# Patient Record
Sex: FEMALE | Race: WHITE | NOT HISPANIC OR LATINO | Employment: OTHER | URBAN - METROPOLITAN AREA
[De-identification: names, ages, dates, MRNs, and addresses within clinical notes are randomized per-mention and may not be internally consistent; named-entity substitution may affect disease eponyms.]

---

## 2023-09-08 PROBLEM — E03.9 HYPOTHYROIDISM: Status: ACTIVE | Noted: 2023-09-08

## 2023-09-08 PROBLEM — N81.3 COMPLETE UTEROVAGINAL PROLAPSE: Status: ACTIVE | Noted: 2023-09-08

## 2023-09-08 PROBLEM — E78.2 COMBINED HYPERLIPIDEMIA: Status: ACTIVE | Noted: 2023-09-08

## 2023-09-08 PROBLEM — M51.26 LUMBAR HERNIATED DISC: Status: ACTIVE | Noted: 2023-09-08

## 2023-09-08 PROBLEM — E78.00 ELEVATED CHOLESTEROL: Status: ACTIVE | Noted: 2023-09-08

## 2023-09-08 PROBLEM — I83.93 VARICOSE VEINS OF LEGS: Status: RESOLVED | Noted: 2023-09-08 | Resolved: 2023-09-08

## 2023-09-08 PROBLEM — N32.81 OVERACTIVE BLADDER: Status: ACTIVE | Noted: 2023-09-08

## 2023-09-08 PROBLEM — M54.12 CERVICAL RADICULOPATHY: Status: ACTIVE | Noted: 2023-09-08

## 2023-09-08 PROBLEM — L90.0 LICHEN SCLEROSUS: Status: ACTIVE | Noted: 2023-09-08

## 2023-09-08 PROBLEM — I83.813 VARICOSE VEINS OF BILATERAL LOWER EXTREMITIES WITH PAIN: Status: ACTIVE | Noted: 2023-09-08

## 2023-09-08 PROBLEM — I83.93 VARICOSE VEINS OF LEGS: Status: ACTIVE | Noted: 2023-09-08

## 2023-09-08 PROBLEM — I65.23 ATHEROSCLEROSIS OF BOTH CAROTID ARTERIES: Status: ACTIVE | Noted: 2023-09-08

## 2023-09-08 PROBLEM — Z15.89 MTHFR MUTATION: Status: ACTIVE | Noted: 2023-09-08

## 2023-09-08 PROBLEM — R22.30 MASS OF HAND: Status: ACTIVE | Noted: 2023-09-08

## 2023-09-08 PROBLEM — E28.39 HYPOESTROGENISM: Status: ACTIVE | Noted: 2023-09-08

## 2023-09-08 PROBLEM — R73.9 HYPERGLYCEMIA: Status: ACTIVE | Noted: 2023-09-08

## 2023-09-08 PROBLEM — R79.89 LOW VITAMIN D LEVEL: Status: ACTIVE | Noted: 2023-09-08

## 2023-09-08 PROBLEM — N39.41 URGE INCONTINENCE: Status: ACTIVE | Noted: 2023-09-08

## 2023-09-08 PROBLEM — M67.40 GANGLION, TENDON SHEATH: Status: ACTIVE | Noted: 2023-09-08

## 2023-09-08 PROBLEM — Z78.0 ASYMPTOMATIC AGE-RELATED POSTMENOPAUSAL STATE: Status: ACTIVE | Noted: 2023-09-08

## 2023-09-08 PROBLEM — N95.2 VAGINAL ATROPHY: Status: ACTIVE | Noted: 2023-09-08

## 2023-09-08 PROBLEM — C73 CANCER OF THYROID (MULTI): Status: ACTIVE | Noted: 2023-09-08

## 2023-09-08 PROBLEM — M54.16 LUMBAR RADICULOPATHY: Status: ACTIVE | Noted: 2023-09-08

## 2023-09-08 PROBLEM — R73.03 PREDIABETES: Status: ACTIVE | Noted: 2023-09-08

## 2023-09-08 RX ORDER — LEVOTHYROXINE SODIUM 112 UG/1
1 TABLET ORAL DAILY
COMMUNITY
Start: 2014-07-07 | End: 2024-01-02

## 2023-09-08 RX ORDER — ACETAMINOPHEN 500 MG
TABLET ORAL
COMMUNITY

## 2023-09-08 RX ORDER — ESTRADIOL 0.1 MG/G
1 CREAM VAGINAL NIGHTLY
COMMUNITY
Start: 2018-02-16 | End: 2023-10-30 | Stop reason: ALTCHOICE

## 2023-09-08 RX ORDER — MAGNESIUM 200 MG
200 TABLET ORAL
COMMUNITY
Start: 2019-08-21

## 2023-09-08 RX ORDER — TRETINOIN 0.25 MG/G
1 CREAM TOPICAL NIGHTLY
COMMUNITY

## 2023-09-08 RX ORDER — IBUPROFEN 100 MG/5ML
1000 SUSPENSION, ORAL (FINAL DOSE FORM) ORAL
COMMUNITY

## 2023-09-08 RX ORDER — TROSPIUM CHLORIDE 20 MG/1
20 TABLET, FILM COATED ORAL 2 TIMES DAILY PRN
COMMUNITY
End: 2023-10-30 | Stop reason: ALTCHOICE

## 2023-09-08 RX ORDER — GLUCOSAMINE/CHONDR SU A SOD 167-133 MG
500 CAPSULE ORAL
COMMUNITY

## 2023-09-08 RX ORDER — BETAMETHASONE DIPROPIONATE 0.5 MG/G
1 CREAM TOPICAL DAILY
COMMUNITY
Start: 2018-11-29 | End: 2023-10-12 | Stop reason: SDUPTHER

## 2023-09-08 RX ORDER — CLOBETASOL PROPIONATE 0.5 MG/G
1 OINTMENT TOPICAL
COMMUNITY
Start: 2020-07-06 | End: 2023-10-30 | Stop reason: ALTCHOICE

## 2023-10-11 NOTE — H&P (VIEW-ONLY)
Urogynecology  Provider:  Nadia Baig MD  [phone]              ASSESSMENT AND PLAN:     Problem List Items Addressed This Visit    None          I spent a total of 25 minutes in face to face and non face to face time.        Nadia Baig MD        HISTORY OF PRESENT ILLNESS    Marcelle Manley a 73 y.o. here today for a follow up     Urinary symptoms:  - Denies concerns  - Not interested in medication at this time     Vaginal symptoms:  - Using PRN Clobetasol about once a month   - Using TV estrogen cream           Past Medical History:   Diagnosis Date    Allergic rhinitis due to pollen     Hayfever    Asymptomatic varicose veins of bilateral lower extremities 12/09/2016    Varicose veins of legs    COVID-19 10/04/2022    COVID-19    Encounter for gynecological examination (general) (routine) without abnormal findings 11/06/2017    Visit for gynecologic examination    Encounter for other preprocedural examination 02/22/2017    Preoperative clearance    Laceration without foreign body of unspecified finger without damage to nail, initial encounter 05/27/2015    Finger laceration    Localized swelling, mass and lump, unspecified upper limb 10/28/2019    Mass of hand    Malignant neoplasm of thyroid gland (CMS/HCC) 08/21/2019    Papillary carcinoma of thyroid    Onycholysis 01/24/2019    Onycholysis of toenail    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    Hypoestrogenism    Other specified abnormal findings of blood chemistry 11/12/2015    Elevated liver function tests    Pain in joints of unspecified hand 06/11/2015    Pain in joint, hand    Pain in left leg 11/18/2020    Pain of left lower extremity    Pain in right ankle and joints of right foot 12/28/2020    Ankle pain, right    Pain in right toe(s) 01/24/2019    Pain around toenail, right foot    Paresthesia of skin     Paresthesia of arm    Personal history of colonic polyps     History of colonic polyps    Personal history of malignant  neoplasm of thyroid 10/25/2021    History of malignant neoplasm of thyroid    Personal history of malignant neoplasm of thyroid 12/26/2018    History of malignant neoplasm of thyroid    Personal history of other diseases of the female genital tract 05/20/2019    History of uterine prolapse    Personal history of other diseases of the respiratory system     History of sinusitis    Personal history of other endocrine, nutritional and metabolic disease 02/22/2017    History of high cholesterol    Personal history of other infectious and parasitic diseases 04/29/2019    History of herpes zoster    Personal history of other specified conditions 05/20/2019    History of urinary incontinence    Personal history of urinary calculi 04/24/2019    History of renal calculi    Prediabetes 10/25/2021    Prediabetes    Radiculopathy, cervical region 11/18/2020    Cervical radiculopathy    Radiculopathy, lumbar region 01/14/2021    Lumbar radiculopathy    Unspecified open wound of unspecified toe(s) with damage to nail, initial encounter 01/24/2019    Traumatic avulsion of nail plate of toe    Urge incontinence 11/15/2021    Urge incontinence    Urinary tract infection, site not specified 01/13/2022    Acute lower UTI    Varicose veins of bilateral lower extremities with pain 08/21/2019    Varicose veins of bilateral lower extremities with pain          Past Surgical History:       Past Surgical History:   Procedure Laterality Date    COLONOSCOPY  02/07/2017    Colonoscopy    TONSILLECTOMY  02/07/2017    Tonsillectomy    TOTAL THYROIDECTOMY  08/11/2014    Thyroid Surgery Total Thyroidectomy         Medications:       Prior to Admission medications    Medication Sig Start Date End Date Taking? Authorizing Provider   ascorbic acid (Vitamin C) 1,000 mg tablet Take 1 tablet (1,000 mg) by mouth.    Historical Provider, MD   betamethasone dipropionate 0.05 % cream Apply 1 Application topically once daily. 11/29/18   Historical Provider,  MD   cholecalciferol (Vitamin D-3) 5,000 Units tablet Take by mouth.    Historical Provider, MD   clobetasol (Temovate) 0.05 % ointment 1 inch. APPLY 1 INCH Twice daily apply to affected area twice daily for 2 weeks, then 1 daily for 2 weeks then prn only 7/6/20   Historical Provider, MD   estradiol (Estrace) 0.01 % (0.1 mg/gram) vaginal cream Insert 1 applicator into the vagina once daily at bedtime. 3 nights per week 2/16/18   Julio César Provider, MD   estrogens, conjugated, (Premarin) vaginal cream Insert 0.0005 g into the vagina 2 times a week. At bedtime 9/1/22   Julio César Provider, MD   fish oil concentrate (Omega-3) 120-180 mg capsule Take 1 capsule (1 g) by mouth.    Historical Provider, MD   L. acidophilus/Bifid. animalis 32 billion cell capsule Take by mouth. 11/12/15   Julio César Provider, MD   magnesium 200 mg tablet Take 1 tablet (200 mg) by mouth. 8/21/19   Historical Provider, MD   multivitamin capsule Take 1 capsule by mouth once daily. 11/12/15   Historical Provider, MD   niacin 500 mg tablet Take 1 tablet (500 mg) by mouth.    Historical Provider, MD   Synthroid 112 mcg tablet Take 1 tablet (112 mcg) by mouth once daily. 7/7/14   Historical Provider, MD   tretinoin (Retin-A) 0.025 % cream Apply 1 Application topically once daily at bedtime.    Historical Provider, MD   trospium (Sanctura) 20 mg tablet Take 1 tablet (20 mg) by mouth 2 times a day as needed.    Historical ProviderMD [facility]  Review of Systems       PHYSICAL EXAM:      There were no vitals taken for this visit.     No LMP recorded.      Declines chaperone for physical exam.      Well developed, well nourished, in no apparent distress.   Neurologic/Psychiatric:  Awake, Alert and Oriented times 3.  Affect normal.     GENITAL/URINARY:       External Genitalia:  The patient has normal appearing external genitalia, normal skenes and bartholins glands, and a normal hair distribution.  Her vulva is without lesions, erythema or  discharge.  It is non-tender with appropriate sensation.     Urethral Meatus:  Size normal, Location normal, Lesions absent, Prolapse absent,      Urethra:  Fullness absent, Masses absent,      Bladder:  Fullness absent, Masses absent, Tenderness absent,      Vagina:  General appearance normal, Estrogen effect normal, Lesions absent, bilateral lichen sclerosis on medial labial. NO lesions to biopsy    Cervix: Normal, no discharge.   Uterus:  normal size  Adnexa:   WNL    Anus/Perineum:  Lesions absent and Masses absent defer exam  No Hemorrhoids      POP-Q  The patient has no prolapse         Data and DIAGNOSTIC STUDIES REVIEWED   No results found.   Lab Results   Component Value Date    UAMICCOMM SEE COMMENT 02/12/2018      Lab Results   Component Value Date    GLUCOSE 102 (H) 02/09/2023    CALCIUM 9.0 02/09/2023     02/09/2023    K 4.2 02/09/2023    CO2 30 02/09/2023     02/09/2023    BUN 20 02/09/2023    CREATININE 0.74 02/09/2023     Lab Results   Component Value Date    WBC 5.2 11/07/2022    HGB 13.9 11/07/2022    HCT 41.0 11/07/2022    MCV 95 11/07/2022     11/07/2022          Nadia Baig MD    ASSESSMENT &PLAN     Assessment:   73 y.o. old female being assessed for lichen sclerosis and vaginal atrophy     Diagnoses:   #1 Lichen sclerosus  #2 Vaginal atrophy    Plan:   Lichen Sclerosis   - Continue use of PRN Clobetasol cream     2. Vaginal atrophy  - Continue use of TV estrogen cream twice a week     Follow-up in 6 months with Dr. Jovanni Booth Attestation  By signing my name below, I, Emmy Knox   attest that this documentation has been prepared under the direction and in the presence of Nadia Baig MD.     Agree with above  I Dr. Baig, personally performed the services described in the documentation which was scribed virtually and confirm it is both complete and accurate.   Nadia Baig MD

## 2023-10-11 NOTE — PROGRESS NOTES
Urogynecology  Provider:  Nadia Baig MD  [phone]              ASSESSMENT AND PLAN:     Problem List Items Addressed This Visit    None          I spent a total of 25 minutes in face to face and non face to face time.        Nadia Baig MD        HISTORY OF PRESENT ILLNESS    Marcelle Manley a 73 y.o. here today for a follow up     Urinary symptoms:  - Denies concerns  - Not interested in medication at this time     Vaginal symptoms:  - Using PRN Clobetasol about once a month   - Using TV estrogen cream           Past Medical History:   Diagnosis Date    Allergic rhinitis due to pollen     Hayfever    Asymptomatic varicose veins of bilateral lower extremities 12/09/2016    Varicose veins of legs    COVID-19 10/04/2022    COVID-19    Encounter for gynecological examination (general) (routine) without abnormal findings 11/06/2017    Visit for gynecologic examination    Encounter for other preprocedural examination 02/22/2017    Preoperative clearance    Laceration without foreign body of unspecified finger without damage to nail, initial encounter 05/27/2015    Finger laceration    Localized swelling, mass and lump, unspecified upper limb 10/28/2019    Mass of hand    Malignant neoplasm of thyroid gland (CMS/HCC) 08/21/2019    Papillary carcinoma of thyroid    Onycholysis 01/24/2019    Onycholysis of toenail    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    Hypoestrogenism    Other specified abnormal findings of blood chemistry 11/12/2015    Elevated liver function tests    Pain in joints of unspecified hand 06/11/2015    Pain in joint, hand    Pain in left leg 11/18/2020    Pain of left lower extremity    Pain in right ankle and joints of right foot 12/28/2020    Ankle pain, right    Pain in right toe(s) 01/24/2019    Pain around toenail, right foot    Paresthesia of skin     Paresthesia of arm    Personal history of colonic polyps     History of colonic polyps    Personal history of malignant  neoplasm of thyroid 10/25/2021    History of malignant neoplasm of thyroid    Personal history of malignant neoplasm of thyroid 12/26/2018    History of malignant neoplasm of thyroid    Personal history of other diseases of the female genital tract 05/20/2019    History of uterine prolapse    Personal history of other diseases of the respiratory system     History of sinusitis    Personal history of other endocrine, nutritional and metabolic disease 02/22/2017    History of high cholesterol    Personal history of other infectious and parasitic diseases 04/29/2019    History of herpes zoster    Personal history of other specified conditions 05/20/2019    History of urinary incontinence    Personal history of urinary calculi 04/24/2019    History of renal calculi    Prediabetes 10/25/2021    Prediabetes    Radiculopathy, cervical region 11/18/2020    Cervical radiculopathy    Radiculopathy, lumbar region 01/14/2021    Lumbar radiculopathy    Unspecified open wound of unspecified toe(s) with damage to nail, initial encounter 01/24/2019    Traumatic avulsion of nail plate of toe    Urge incontinence 11/15/2021    Urge incontinence    Urinary tract infection, site not specified 01/13/2022    Acute lower UTI    Varicose veins of bilateral lower extremities with pain 08/21/2019    Varicose veins of bilateral lower extremities with pain          Past Surgical History:       Past Surgical History:   Procedure Laterality Date    COLONOSCOPY  02/07/2017    Colonoscopy    TONSILLECTOMY  02/07/2017    Tonsillectomy    TOTAL THYROIDECTOMY  08/11/2014    Thyroid Surgery Total Thyroidectomy         Medications:       Prior to Admission medications    Medication Sig Start Date End Date Taking? Authorizing Provider   ascorbic acid (Vitamin C) 1,000 mg tablet Take 1 tablet (1,000 mg) by mouth.    Historical Provider, MD   betamethasone dipropionate 0.05 % cream Apply 1 Application topically once daily. 11/29/18   Historical Provider,  MD   cholecalciferol (Vitamin D-3) 5,000 Units tablet Take by mouth.    Historical Provider, MD   clobetasol (Temovate) 0.05 % ointment 1 inch. APPLY 1 INCH Twice daily apply to affected area twice daily for 2 weeks, then 1 daily for 2 weeks then prn only 7/6/20   Historical Provider, MD   estradiol (Estrace) 0.01 % (0.1 mg/gram) vaginal cream Insert 1 applicator into the vagina once daily at bedtime. 3 nights per week 2/16/18   Julio César Provider, MD   estrogens, conjugated, (Premarin) vaginal cream Insert 0.0005 g into the vagina 2 times a week. At bedtime 9/1/22   Julio César Provider, MD   fish oil concentrate (Omega-3) 120-180 mg capsule Take 1 capsule (1 g) by mouth.    Historical Provider, MD   L. acidophilus/Bifid. animalis 32 billion cell capsule Take by mouth. 11/12/15   Julio César Provider, MD   magnesium 200 mg tablet Take 1 tablet (200 mg) by mouth. 8/21/19   Historical Provider, MD   multivitamin capsule Take 1 capsule by mouth once daily. 11/12/15   Historical Provider, MD   niacin 500 mg tablet Take 1 tablet (500 mg) by mouth.    Historical Provider, MD   Synthroid 112 mcg tablet Take 1 tablet (112 mcg) by mouth once daily. 7/7/14   Historical Provider, MD   tretinoin (Retin-A) 0.025 % cream Apply 1 Application topically once daily at bedtime.    Historical Provider, MD   trospium (Sanctura) 20 mg tablet Take 1 tablet (20 mg) by mouth 2 times a day as needed.    Historical ProviderMD [facility]  Review of Systems       PHYSICAL EXAM:      There were no vitals taken for this visit.     No LMP recorded.      Declines chaperone for physical exam.      Well developed, well nourished, in no apparent distress.   Neurologic/Psychiatric:  Awake, Alert and Oriented times 3.  Affect normal.     GENITAL/URINARY:       External Genitalia:  The patient has normal appearing external genitalia, normal skenes and bartholins glands, and a normal hair distribution.  Her vulva is without lesions, erythema or  discharge.  It is non-tender with appropriate sensation.     Urethral Meatus:  Size normal, Location normal, Lesions absent, Prolapse absent,      Urethra:  Fullness absent, Masses absent,      Bladder:  Fullness absent, Masses absent, Tenderness absent,      Vagina:  General appearance normal, Estrogen effect normal, Lesions absent, bilateral lichen sclerosis on medial labial. NO lesions to biopsy    Cervix: Normal, no discharge.   Uterus:  normal size  Adnexa:   WNL    Anus/Perineum:  Lesions absent and Masses absent defer exam  No Hemorrhoids      POP-Q  The patient has no prolapse         Data and DIAGNOSTIC STUDIES REVIEWED   No results found.   Lab Results   Component Value Date    UAMICCOMM SEE COMMENT 02/12/2018      Lab Results   Component Value Date    GLUCOSE 102 (H) 02/09/2023    CALCIUM 9.0 02/09/2023     02/09/2023    K 4.2 02/09/2023    CO2 30 02/09/2023     02/09/2023    BUN 20 02/09/2023    CREATININE 0.74 02/09/2023     Lab Results   Component Value Date    WBC 5.2 11/07/2022    HGB 13.9 11/07/2022    HCT 41.0 11/07/2022    MCV 95 11/07/2022     11/07/2022          Nadia Baig MD    ASSESSMENT &PLAN     Assessment:   73 y.o. old female being assessed for lichen sclerosis and vaginal atrophy     Diagnoses:   #1 Lichen sclerosus  #2 Vaginal atrophy    Plan:   Lichen Sclerosis   - Continue use of PRN Clobetasol cream     2. Vaginal atrophy  - Continue use of TV estrogen cream twice a week     Follow-up in 6 months with Dr. Jovanni Booth Attestation  By signing my name below, I, Emmy Knox   attest that this documentation has been prepared under the direction and in the presence of Nadia Baig MD.     Agree with above  I Dr. Baig, personally performed the services described in the documentation which was scribed virtually and confirm it is both complete and accurate.   Nadia Baig MD

## 2023-10-12 ENCOUNTER — OFFICE VISIT (OUTPATIENT)
Dept: OBSTETRICS AND GYNECOLOGY | Facility: CLINIC | Age: 73
End: 2023-10-12
Payer: MEDICARE

## 2023-10-12 VITALS
WEIGHT: 174 LBS | DIASTOLIC BLOOD PRESSURE: 70 MMHG | SYSTOLIC BLOOD PRESSURE: 120 MMHG | HEIGHT: 70 IN | BODY MASS INDEX: 24.91 KG/M2

## 2023-10-12 DIAGNOSIS — L90.0 LICHEN SCLEROSUS ET ATROPHICUS: ICD-10-CM

## 2023-10-12 DIAGNOSIS — N32.81 OVERACTIVE BLADDER: Primary | ICD-10-CM

## 2023-10-12 PROCEDURE — 1159F MED LIST DOCD IN RCRD: CPT | Performed by: OBSTETRICS & GYNECOLOGY

## 2023-10-12 PROCEDURE — 99213 OFFICE O/P EST LOW 20 MIN: CPT | Performed by: OBSTETRICS & GYNECOLOGY

## 2023-10-12 PROCEDURE — 99213 OFFICE O/P EST LOW 20 MIN: CPT | Mod: PO | Performed by: OBSTETRICS & GYNECOLOGY

## 2023-10-12 PROCEDURE — 1126F AMNT PAIN NOTED NONE PRSNT: CPT | Performed by: OBSTETRICS & GYNECOLOGY

## 2023-10-12 RX ORDER — BETAMETHASONE DIPROPIONATE 0.5 MG/G
1 CREAM TOPICAL DAILY
Qty: 1 G | Refills: 2 | Status: SHIPPED | OUTPATIENT
Start: 2023-10-12 | End: 2023-10-25 | Stop reason: SDUPTHER

## 2023-10-12 RX ORDER — BETAMETHASONE DIPROPIONATE 0.5 MG/G
1 CREAM TOPICAL DAILY
Qty: 1 G | Refills: 2 | Status: SHIPPED | OUTPATIENT
Start: 2023-10-12 | End: 2023-10-12 | Stop reason: SDUPTHER

## 2023-10-12 ASSESSMENT — PATIENT HEALTH QUESTIONNAIRE - PHQ9
SUM OF ALL RESPONSES TO PHQ9 QUESTIONS 1 AND 2: 0
2. FEELING DOWN, DEPRESSED OR HOPELESS: NOT AT ALL
1. LITTLE INTEREST OR PLEASURE IN DOING THINGS: NOT AT ALL

## 2023-10-12 ASSESSMENT — PAIN SCALES - GENERAL: PAINLEVEL: 0-NO PAIN

## 2023-10-25 ENCOUNTER — TELEPHONE (OUTPATIENT)
Dept: OBSTETRICS AND GYNECOLOGY | Facility: CLINIC | Age: 73
End: 2023-10-25
Payer: MEDICARE

## 2023-10-25 DIAGNOSIS — L90.0 LICHEN SCLEROSUS ET ATROPHICUS: ICD-10-CM

## 2023-10-25 RX ORDER — BETAMETHASONE DIPROPIONATE 0.5 MG/G
1 CREAM TOPICAL DAILY
Qty: 15 G | Refills: 1 | Status: SHIPPED | OUTPATIENT
Start: 2023-10-25 | End: 2024-02-22 | Stop reason: SDUPTHER

## 2023-10-27 ENCOUNTER — ANESTHESIA EVENT (OUTPATIENT)
Dept: OPERATING ROOM | Facility: HOSPITAL | Age: 73
End: 2023-10-27
Payer: MEDICARE

## 2023-10-30 ENCOUNTER — ANESTHESIA (OUTPATIENT)
Dept: OPERATING ROOM | Facility: HOSPITAL | Age: 73
End: 2023-10-30
Payer: MEDICARE

## 2023-10-30 ENCOUNTER — HOSPITAL ENCOUNTER (OUTPATIENT)
Dept: OPERATING ROOM | Facility: HOSPITAL | Age: 73
Setting detail: OUTPATIENT SURGERY
Discharge: HOME | End: 2023-10-30
Payer: MEDICARE

## 2023-10-30 VITALS
SYSTOLIC BLOOD PRESSURE: 163 MMHG | TEMPERATURE: 97.5 F | BODY MASS INDEX: 24.62 KG/M2 | WEIGHT: 171.96 LBS | HEART RATE: 70 BPM | HEIGHT: 70 IN | RESPIRATION RATE: 16 BRPM | DIASTOLIC BLOOD PRESSURE: 87 MMHG | OXYGEN SATURATION: 100 %

## 2023-10-30 DIAGNOSIS — Z12.11 ENCOUNTER FOR SCREENING FOR MALIGNANT NEOPLASM OF COLON: ICD-10-CM

## 2023-10-30 PROCEDURE — A45378 PR COLONOSCOPY,DIAGNOSTIC: Performed by: NURSE ANESTHETIST, CERTIFIED REGISTERED

## 2023-10-30 PROCEDURE — 3600000002 HC OR TIME - INITIAL BASE CHARGE - PROCEDURE LEVEL TWO: Performed by: NURSE ANESTHETIST, CERTIFIED REGISTERED

## 2023-10-30 PROCEDURE — 2500000004 HC RX 250 GENERAL PHARMACY W/ HCPCS (ALT 636 FOR OP/ED): Performed by: ANESTHESIOLOGY

## 2023-10-30 PROCEDURE — 3700000001 HC GENERAL ANESTHESIA TIME - INITIAL BASE CHARGE: Performed by: NURSE ANESTHETIST, CERTIFIED REGISTERED

## 2023-10-30 PROCEDURE — 3600000007 HC OR TIME - EACH INCREMENTAL 1 MINUTE - PROCEDURE LEVEL TWO: Performed by: NURSE ANESTHETIST, CERTIFIED REGISTERED

## 2023-10-30 PROCEDURE — 2500000005 HC RX 250 GENERAL PHARMACY W/O HCPCS: Performed by: NURSE ANESTHETIST, CERTIFIED REGISTERED

## 2023-10-30 PROCEDURE — 7100000009 HC PHASE TWO TIME - INITIAL BASE CHARGE: Performed by: NURSE ANESTHETIST, CERTIFIED REGISTERED

## 2023-10-30 PROCEDURE — G0121 COLON CA SCRN NOT HI RSK IND: HCPCS | Performed by: SURGERY

## 2023-10-30 PROCEDURE — 7100000010 HC PHASE TWO TIME - EACH INCREMENTAL 1 MINUTE: Performed by: NURSE ANESTHETIST, CERTIFIED REGISTERED

## 2023-10-30 PROCEDURE — 3700000002 HC GENERAL ANESTHESIA TIME - EACH INCREMENTAL 1 MINUTE: Performed by: NURSE ANESTHETIST, CERTIFIED REGISTERED

## 2023-10-30 PROCEDURE — 2500000004 HC RX 250 GENERAL PHARMACY W/ HCPCS (ALT 636 FOR OP/ED): Performed by: NURSE ANESTHETIST, CERTIFIED REGISTERED

## 2023-10-30 RX ORDER — DROPERIDOL 2.5 MG/ML
0.62 INJECTION, SOLUTION INTRAMUSCULAR; INTRAVENOUS ONCE AS NEEDED
Status: DISCONTINUED | OUTPATIENT
Start: 2023-10-30 | End: 2023-10-31 | Stop reason: HOSPADM

## 2023-10-30 RX ORDER — PROPOFOL 10 MG/ML
INJECTION, EMULSION INTRAVENOUS CONTINUOUS PRN
Status: DISCONTINUED | OUTPATIENT
Start: 2023-10-30 | End: 2023-10-30

## 2023-10-30 RX ORDER — ONDANSETRON HYDROCHLORIDE 2 MG/ML
4 INJECTION, SOLUTION INTRAVENOUS ONCE AS NEEDED
Status: DISCONTINUED | OUTPATIENT
Start: 2023-10-30 | End: 2023-10-31 | Stop reason: HOSPADM

## 2023-10-30 RX ORDER — PROPOFOL 10 MG/ML
INJECTION, EMULSION INTRAVENOUS AS NEEDED
Status: DISCONTINUED | OUTPATIENT
Start: 2023-10-30 | End: 2023-10-30

## 2023-10-30 RX ORDER — SODIUM CHLORIDE, SODIUM LACTATE, POTASSIUM CHLORIDE, CALCIUM CHLORIDE 600; 310; 30; 20 MG/100ML; MG/100ML; MG/100ML; MG/100ML
100 INJECTION, SOLUTION INTRAVENOUS CONTINUOUS
Status: DISCONTINUED | OUTPATIENT
Start: 2023-10-30 | End: 2023-10-31 | Stop reason: HOSPADM

## 2023-10-30 RX ORDER — LIDOCAINE HYDROCHLORIDE 10 MG/ML
INJECTION, SOLUTION EPIDURAL; INFILTRATION; INTRACAUDAL; PERINEURAL AS NEEDED
Status: DISCONTINUED | OUTPATIENT
Start: 2023-10-30 | End: 2023-10-30

## 2023-10-30 RX ADMIN — SODIUM CHLORIDE, POTASSIUM CHLORIDE, SODIUM LACTATE AND CALCIUM CHLORIDE 100 ML/HR: 600; 310; 30; 20 INJECTION, SOLUTION INTRAVENOUS at 10:56

## 2023-10-30 RX ADMIN — PROPOFOL 140 MCG/KG/MIN: 10 INJECTION, EMULSION INTRAVENOUS at 11:52

## 2023-10-30 RX ADMIN — SODIUM CHLORIDE, SODIUM LACTATE, POTASSIUM CHLORIDE, AND CALCIUM CHLORIDE: 600; 310; 30; 20 INJECTION, SOLUTION INTRAVENOUS at 11:40

## 2023-10-30 RX ADMIN — PROPOFOL 50 MG: 10 INJECTION, EMULSION INTRAVENOUS at 11:52

## 2023-10-30 RX ADMIN — LIDOCAINE HYDROCHLORIDE 20 MG: 10 INJECTION, SOLUTION EPIDURAL; INFILTRATION; INTRACAUDAL; PERINEURAL at 11:52

## 2023-10-30 SDOH — HEALTH STABILITY: MENTAL HEALTH: CURRENT SMOKER: 0

## 2023-10-30 ASSESSMENT — PAIN SCALES - GENERAL: PAIN_LEVEL: 0

## 2023-10-30 NOTE — ANESTHESIA POSTPROCEDURE EVALUATION
Patient: Marcelle Shelton    Procedure Summary       Date: 10/30/23 Room / Location: [facility] OR    Anesthesia Start: 1140 Anesthesia Stop: 1214    Procedure: COLONOSCOPY Diagnosis: Encounter for screening for malignant neoplasm of colon    Scheduled Providers: Celestine Urrutia MD Responsible Provider: KATH Everett    Anesthesia Type: MAC ASA Status: 3            Anesthesia Type: MAC    Vitals Value Taken Time   /87 10/30/23 1240   Temp 36.4 °C (97.5 °F) 10/30/23 1210   Pulse 70 10/30/23 1240   Resp 16 10/30/23 1240   SpO2 100 % 10/30/23 1240       Anesthesia Post Evaluation    Patient location during evaluation: PACU  Patient participation: complete - patient participated  Level of consciousness: awake and alert  Pain score: 0  Pain management: adequate  Airway patency: patent  Cardiovascular status: acceptable  Respiratory status: acceptable  Hydration status: acceptable        No notable events documented.

## 2023-10-30 NOTE — ANESTHESIA PREPROCEDURE EVALUATION
Patient: Marcelle Shelton    Procedure Information       Date/Time: 10/30/23 1120    Scheduled providers: Celestine Urrutia MD    Procedure: COLONOSCOPY    Location: [facility] OR          Vitals:    10/30/23 1049   BP: 169/82   Pulse:    Resp:    Temp:    SpO2:        Past Surgical History:   Procedure Laterality Date    COLONOSCOPY  02/07/2017    Colonoscopy    TONSILLECTOMY  02/07/2017    Tonsillectomy    TOTAL THYROIDECTOMY  08/11/2014    Thyroid Surgery Total Thyroidectomy     Past Medical History:   Diagnosis Date    Allergic rhinitis due to pollen     Hayfever    Asymptomatic varicose veins of bilateral lower extremities 12/09/2016    Varicose veins of legs    COVID-19 10/04/2022    COVID-19    Encounter for gynecological examination (general) (routine) without abnormal findings 11/06/2017    Visit for gynecologic examination    Encounter for other preprocedural examination 02/22/2017    Preoperative clearance    Laceration without foreign body of unspecified finger without damage to nail, initial encounter 05/27/2015    Finger laceration    Localized swelling, mass and lump, unspecified upper limb 10/28/2019    Mass of hand    Malignant neoplasm of thyroid gland (CMS/HCC) 08/21/2019    Papillary carcinoma of thyroid    Onycholysis 01/24/2019    Onycholysis of toenail    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    Hypoestrogenism    Other specified abnormal findings of blood chemistry 11/12/2015    Elevated liver function tests    Pain in joints of unspecified hand 06/11/2015    Pain in joint, hand    Pain in left leg 11/18/2020    Pain of left lower extremity    Pain in right ankle and joints of right foot 12/28/2020    Ankle pain, right    Pain in right toe(s) 01/24/2019    Pain around toenail, right foot    Paresthesia of skin     Paresthesia of arm    Personal history of colonic polyps     History of colonic polyps    Personal history of malignant neoplasm of thyroid 10/25/2021    History  of malignant neoplasm of thyroid    Personal history of malignant neoplasm of thyroid 12/26/2018    History of malignant neoplasm of thyroid    Personal history of other diseases of the female genital tract 05/20/2019    History of uterine prolapse    Personal history of other diseases of the respiratory system     History of sinusitis    Personal history of other endocrine, nutritional and metabolic disease 02/22/2017    History of high cholesterol    Personal history of other infectious and parasitic diseases 04/29/2019    History of herpes zoster    Personal history of other specified conditions 05/20/2019    History of urinary incontinence    Personal history of urinary calculi 04/24/2019    History of renal calculi    Prediabetes 10/25/2021    Prediabetes    Radiculopathy, cervical region 11/18/2020    Cervical radiculopathy    Radiculopathy, lumbar region 01/14/2021    Lumbar radiculopathy    Unspecified open wound of unspecified toe(s) with damage to nail, initial encounter 01/24/2019    Traumatic avulsion of nail plate of toe    Urge incontinence 11/15/2021    Urge incontinence    Urinary tract infection, site not specified 01/13/2022    Acute lower UTI    Varicose veins of bilateral lower extremities with pain 08/21/2019    Varicose veins of bilateral lower extremities with pain       Current Outpatient Medications:     ascorbic acid (Vitamin C) 1,000 mg tablet, Take 1 tablet (1,000 mg) by mouth., Disp: , Rfl:     betamethasone dipropionate 0.05 % cream, Apply 1 Application topically once daily., Disp: 15 g, Rfl: 1    cholecalciferol (Vitamin D-3) 5,000 Units tablet, Take by mouth., Disp: , Rfl:     fish oil concentrate (Omega-3) 120-180 mg capsule, Take 1 capsule (1 g) by mouth., Disp: , Rfl:     L. acidophilus/Bifid. animalis 32 billion cell capsule, Take by mouth., Disp: , Rfl:     magnesium 200 mg tablet, Take 1 tablet (200 mg) by mouth., Disp: , Rfl:     multivitamin capsule, Take 1 capsule by mouth  once daily., Disp: , Rfl:     niacin 500 mg tablet, Take 1 tablet (500 mg) by mouth., Disp: , Rfl:     Synthroid 112 mcg tablet, Take 1 tablet (112 mcg) by mouth once daily., Disp: , Rfl:     tretinoin (Retin-A) 0.025 % cream, Apply 1 Application topically once daily at bedtime., Disp: , Rfl:     Current Facility-Administered Medications:     lactated Ringer's infusion, 100 mL/hr, intravenous, Continuous, Edgar Zambrano MD, Last Rate: 100 mL/hr at 10/30/23 1056, 100 mL/hr at 10/30/23 1056  Prior to Admission medications    Medication Sig Start Date End Date Taking? Authorizing Provider   ascorbic acid (Vitamin C) 1,000 mg tablet Take 1 tablet (1,000 mg) by mouth.   Yes Historical Provider, MD   betamethasone dipropionate 0.05 % cream Apply 1 Application topically once daily. 10/25/23  Yes Imelda Graham, APRN-CNP   cholecalciferol (Vitamin D-3) 5,000 Units tablet Take by mouth.   Yes Historical Provider, MD   fish oil concentrate (Omega-3) 120-180 mg capsule Take 1 capsule (1 g) by mouth.   Yes Historical Provider, MD   L. acidophilus/Bifid. animalis 32 billion cell capsule Take by mouth. 11/12/15  Yes Historical Provider, MD   magnesium 200 mg tablet Take 1 tablet (200 mg) by mouth. 8/21/19  Yes Historical Provider, MD   multivitamin capsule Take 1 capsule by mouth once daily. 11/12/15  Yes Historical Provider, MD   niacin 500 mg tablet Take 1 tablet (500 mg) by mouth.   Yes Historical Provider, MD   Synthroid 112 mcg tablet Take 1 tablet (112 mcg) by mouth once daily. 7/7/14  Yes Historical Provider, MD   tretinoin (Retin-A) 0.025 % cream Apply 1 Application topically once daily at bedtime.   Yes Historical Provider, MD   clobetasol (Temovate) 0.05 % ointment 1 inch. APPLY 1 INCH Twice daily apply to affected area twice daily for 2 weeks, then 1 daily for 2 weeks then prn only 7/6/20 10/30/23 Yes Historical Provider, MD   estradiol (Estrace) 0.01 % (0.1 mg/gram) vaginal cream Insert 1 applicator into the  vagina once daily at bedtime. 3 nights per week 2/16/18 10/30/23 Yes Historical Provider, MD   betamethasone dipropionate 0.05 % cream Apply 1 Application topically once daily. 10/12/23 10/25/23  Nadia Baig MD   estrogens, conjugated, (Premarin) vaginal cream Insert 0.0005 g into the vagina 2 times a week. At bedtime 9/1/22 10/30/23  Historical Provider, MD   trospium (Sanctura) 20 mg tablet Take 1 tablet (20 mg) by mouth 2 times a day as needed.  10/30/23  Historical Provider, MD     No Known Allergies  Social History     Tobacco Use    Smoking status: Never    Smokeless tobacco: Never   Substance Use Topics    Alcohol use: Not on file         Chemistry    Lab Results   Component Value Date/Time     02/09/2023 0827    K 4.2 02/09/2023 0827     02/09/2023 0827    CO2 30 02/09/2023 0827    BUN 20 02/09/2023 0827    CREATININE 0.74 02/09/2023 0827    Lab Results   Component Value Date/Time    CALCIUM 9.0 02/09/2023 0827    ALKPHOS 69 02/09/2023 0827    AST 18 02/09/2023 0827    ALT 18 02/09/2023 0827    BILITOT 0.6 02/09/2023 0827          Lab Results   Component Value Date/Time    WBC 5.2 11/07/2022 0958    HGB 13.9 11/07/2022 0958    HCT 41.0 11/07/2022 0958     11/07/2022 0958     Lab Results   Component Value Date/Time    PROTIME 11.9 04/17/2019 0715    INR 1.1 04/17/2019 0715     No results found for this or any previous visit (from the past 4464 hour(s)).  No results found for this or any previous visit from the past 1095 days.    Relevant Problems   Cardiovascular   (+) Atherosclerosis of both carotid arteries   (+) Combined hyperlipidemia   (+) Elevated cholesterol      Endocrine   (+) Cancer of thyroid (CMS/HCC)   (+) Hypothyroidism      Neuro/Psych   (+) Atherosclerosis of both carotid arteries   (+) Cervical radiculopathy   (+) Lumbar radiculopathy      Musculoskeletal   (+) Lumbar herniated disc       Clinical information reviewed:   Tobacco  Allergies  Meds   Med Hx  Surg  Hx  OB Status  Fam Hx  Soc   Hx        NPO Detail:  NPO/Void Status  Date of Last Liquid: 10/29/23  Time of Last Liquid: 2300  Date of Last Solid: 10/29/23  Time of Last Solid: 2300  Last Intake Type: Clear fluids  Time of Last Void: 1000         Physical Exam    Airway  Mallampati: II     Cardiovascular - normal exam     Dental    Pulmonary    Abdominal            Anesthesia Plan    ASA 3     MAC     The patient is not a current smoker.  Patient was not previously instructed to abstain from smoking on day of procedure.  Patient did not smoke on day of procedure.  Education provided regarding risk of obstructive sleep apnea.  intravenous induction   Anesthetic plan and risks discussed with patient.    Plan discussed with CRNA.

## 2023-12-28 ENCOUNTER — APPOINTMENT (OUTPATIENT)
Dept: ENDOCRINOLOGY | Facility: CLINIC | Age: 73
End: 2023-12-28
Payer: MEDICARE

## 2024-01-02 DIAGNOSIS — E03.9 HYPOTHYROIDISM, UNSPECIFIED TYPE: Primary | ICD-10-CM

## 2024-01-02 RX ORDER — LEVOTHYROXINE SODIUM 112 UG/1
112 TABLET ORAL DAILY
Qty: 90 TABLET | Refills: 0 | Status: SHIPPED | OUTPATIENT
Start: 2024-01-02 | End: 2024-04-03 | Stop reason: SDUPTHER

## 2024-01-24 ENCOUNTER — APPOINTMENT (OUTPATIENT)
Dept: CARDIOLOGY | Facility: HOSPITAL | Age: 74
End: 2024-01-24
Payer: MEDICARE

## 2024-01-24 ENCOUNTER — HOSPITAL ENCOUNTER (EMERGENCY)
Facility: HOSPITAL | Age: 74
Discharge: HOME | End: 2024-01-24
Attending: EMERGENCY MEDICINE
Payer: MEDICARE

## 2024-01-24 ENCOUNTER — APPOINTMENT (OUTPATIENT)
Dept: RADIOLOGY | Facility: HOSPITAL | Age: 74
End: 2024-01-24
Payer: MEDICARE

## 2024-01-24 VITALS
HEIGHT: 70 IN | SYSTOLIC BLOOD PRESSURE: 162 MMHG | BODY MASS INDEX: 25.77 KG/M2 | DIASTOLIC BLOOD PRESSURE: 80 MMHG | RESPIRATION RATE: 19 BRPM | OXYGEN SATURATION: 100 % | WEIGHT: 180 LBS | HEART RATE: 72 BPM | TEMPERATURE: 97.7 F

## 2024-01-24 DIAGNOSIS — I10 HYPERTENSION, UNSPECIFIED TYPE: Primary | ICD-10-CM

## 2024-01-24 LAB
ANION GAP SERPL CALC-SCNC: 12 MMOL/L (ref 10–20)
BASOPHILS # BLD AUTO: 0.03 X10*3/UL (ref 0–0.1)
BASOPHILS NFR BLD AUTO: 0.6 %
BUN SERPL-MCNC: 14 MG/DL (ref 6–23)
CALCIUM SERPL-MCNC: 9.1 MG/DL (ref 8.6–10.3)
CARDIAC TROPONIN I PNL SERPL HS: 4 NG/L (ref 0–13)
CHLORIDE SERPL-SCNC: 105 MMOL/L (ref 98–107)
CO2 SERPL-SCNC: 28 MMOL/L (ref 21–32)
CREAT SERPL-MCNC: 0.61 MG/DL (ref 0.5–1.05)
EGFRCR SERPLBLD CKD-EPI 2021: >90 ML/MIN/1.73M*2
EOSINOPHIL # BLD AUTO: 0.2 X10*3/UL (ref 0–0.4)
EOSINOPHIL NFR BLD AUTO: 3.8 %
ERYTHROCYTE [DISTWIDTH] IN BLOOD BY AUTOMATED COUNT: 13.2 % (ref 11.5–14.5)
GLUCOSE SERPL-MCNC: 93 MG/DL (ref 74–99)
HCT VFR BLD AUTO: 42.8 % (ref 36–46)
HGB BLD-MCNC: 14.4 G/DL (ref 12–16)
IMM GRANULOCYTES # BLD AUTO: 0.03 X10*3/UL (ref 0–0.5)
IMM GRANULOCYTES NFR BLD AUTO: 0.6 % (ref 0–0.9)
LYMPHOCYTES # BLD AUTO: 1.66 X10*3/UL (ref 0.8–3)
LYMPHOCYTES NFR BLD AUTO: 31.4 %
MCH RBC QN AUTO: 30.9 PG (ref 26–34)
MCHC RBC AUTO-ENTMCNC: 33.6 G/DL (ref 32–36)
MCV RBC AUTO: 92 FL (ref 80–100)
MONOCYTES # BLD AUTO: 0.45 X10*3/UL (ref 0.05–0.8)
MONOCYTES NFR BLD AUTO: 8.5 %
NEUTROPHILS # BLD AUTO: 2.92 X10*3/UL (ref 1.6–5.5)
NEUTROPHILS NFR BLD AUTO: 55.1 %
NRBC BLD-RTO: 0 /100 WBCS (ref 0–0)
PLATELET # BLD AUTO: 256 X10*3/UL (ref 150–450)
POTASSIUM SERPL-SCNC: 3.7 MMOL/L (ref 3.5–5.3)
RBC # BLD AUTO: 4.66 X10*6/UL (ref 4–5.2)
SODIUM SERPL-SCNC: 141 MMOL/L (ref 136–145)
WBC # BLD AUTO: 5.3 X10*3/UL (ref 4.4–11.3)

## 2024-01-24 PROCEDURE — 36415 COLL VENOUS BLD VENIPUNCTURE: CPT | Performed by: EMERGENCY MEDICINE

## 2024-01-24 PROCEDURE — 85025 COMPLETE CBC W/AUTO DIFF WBC: CPT | Performed by: EMERGENCY MEDICINE

## 2024-01-24 PROCEDURE — 2500000004 HC RX 250 GENERAL PHARMACY W/ HCPCS (ALT 636 FOR OP/ED): Performed by: EMERGENCY MEDICINE

## 2024-01-24 PROCEDURE — 70450 CT HEAD/BRAIN W/O DYE: CPT

## 2024-01-24 PROCEDURE — 93005 ELECTROCARDIOGRAM TRACING: CPT

## 2024-01-24 PROCEDURE — 80048 BASIC METABOLIC PNL TOTAL CA: CPT | Performed by: EMERGENCY MEDICINE

## 2024-01-24 PROCEDURE — 84484 ASSAY OF TROPONIN QUANT: CPT | Performed by: EMERGENCY MEDICINE

## 2024-01-24 PROCEDURE — 96360 HYDRATION IV INFUSION INIT: CPT

## 2024-01-24 PROCEDURE — 99285 EMERGENCY DEPT VISIT HI MDM: CPT | Mod: 25,27

## 2024-01-24 PROCEDURE — 70450 CT HEAD/BRAIN W/O DYE: CPT | Performed by: RADIOLOGY

## 2024-01-24 RX ADMIN — SODIUM CHLORIDE 500 ML: 9 INJECTION, SOLUTION INTRAVENOUS at 12:30

## 2024-01-24 ASSESSMENT — COLUMBIA-SUICIDE SEVERITY RATING SCALE - C-SSRS
2. HAVE YOU ACTUALLY HAD ANY THOUGHTS OF KILLING YOURSELF?: NO
1. IN THE PAST MONTH, HAVE YOU WISHED YOU WERE DEAD OR WISHED YOU COULD GO TO SLEEP AND NOT WAKE UP?: NO
6. HAVE YOU EVER DONE ANYTHING, STARTED TO DO ANYTHING, OR PREPARED TO DO ANYTHING TO END YOUR LIFE?: NO

## 2024-01-24 NOTE — ED PROVIDER NOTES
HPI   Chief Complaint   Patient presents with    Hypertension     Pt presents to the ER with hypertension, PT states that she has had some tingling sensations in her left arm and leg for a few days, but states that this has happened before. PT denies any chest pain or shortness of breath.        73-year-old female presents with tingling in the left arm and left leg and high blood pressure.  Patient noticed tingling earlier this morning and ringing in her ears.  She states this has happened before but she just kind of let it go in the past.  She denies any chest pain shortness of breath blurry vision double vision but did have a slight headache.  She took her blood pressure and it was 129/97 then took it again it was 146/92.  She decided to come here because that was very high for her.  She currently feels just fine.  No recent illness fevers chills or night sweats.    Patient attributed to tingling in her legs are getting some varicose vein leg removed.                            [place] Coma Scale Score: 15                  Patient History   Past Medical History:   Diagnosis Date    Allergic rhinitis due to pollen     Hayfever    Asymptomatic varicose veins of bilateral lower extremities 12/09/2016    Varicose veins of legs    COVID-19 10/04/2022    COVID-19    Encounter for gynecological examination (general) (routine) without abnormal findings 11/06/2017    Visit for gynecologic examination    Encounter for other preprocedural examination 02/22/2017    Preoperative clearance    Laceration without foreign body of unspecified finger without damage to nail, initial encounter 05/27/2015    Finger laceration    Localized swelling, mass and lump, unspecified upper limb 10/28/2019    Mass of hand    Malignant neoplasm of thyroid gland (CMS/HCC) 08/21/2019    Papillary carcinoma of thyroid    Onycholysis 01/24/2019    Onycholysis of toenail    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    Hypoestrogenism    Other specified  abnormal findings of blood chemistry 11/12/2015    Elevated liver function tests    Pain in joints of unspecified hand 06/11/2015    Pain in joint, hand    Pain in left leg 11/18/2020    Pain of left lower extremity    Pain in right ankle and joints of right foot 12/28/2020    Ankle pain, right    Pain in right toe(s) 01/24/2019    Pain around toenail, right foot    Paresthesia of skin     Paresthesia of arm    Personal history of colonic polyps     History of colonic polyps    Personal history of malignant neoplasm of thyroid 10/25/2021    History of malignant neoplasm of thyroid    Personal history of malignant neoplasm of thyroid 12/26/2018    History of malignant neoplasm of thyroid    Personal history of other diseases of the female genital tract 05/20/2019    History of uterine prolapse    Personal history of other diseases of the respiratory system     History of sinusitis    Personal history of other endocrine, nutritional and metabolic disease 02/22/2017    History of high cholesterol    Personal history of other infectious and parasitic diseases 04/29/2019    History of herpes zoster    Personal history of other specified conditions 05/20/2019    History of urinary incontinence    Personal history of urinary calculi 04/24/2019    History of renal calculi    Prediabetes 10/25/2021    Prediabetes    Radiculopathy, cervical region 11/18/2020    Cervical radiculopathy    Radiculopathy, lumbar region 01/14/2021    Lumbar radiculopathy    Unspecified open wound of unspecified toe(s) with damage to nail, initial encounter 01/24/2019    Traumatic avulsion of nail plate of toe    Urge incontinence 11/15/2021    Urge incontinence    Urinary tract infection, site not specified 01/13/2022    Acute lower UTI    Varicose veins of bilateral lower extremities with pain 08/21/2019    Varicose veins of bilateral lower extremities with pain     Past Surgical History:   Procedure Laterality Date    COLONOSCOPY  02/07/2017     Colonoscopy    TONSILLECTOMY  02/07/2017    Tonsillectomy    TOTAL THYROIDECTOMY  08/11/2014    Thyroid Surgery Total Thyroidectomy     Family History   Problem Relation Name Age of Onset    Diabetes Mother      Kidney disease Mother      Hypertension Mother      Heart disease Mother      Hypertension Father      Diabetes Father      Parkinsonism Father      Hypertension Maternal Grandmother      Hypertension Maternal Grandfather      Heart disease Paternal Grandmother      Heart disease Paternal Grandfather       Social History     Tobacco Use    Smoking status: Never    Smokeless tobacco: Never   Substance Use Topics    Alcohol use: Not on file    Drug use: Not on file       Physical Exam   ED Triage Vitals [01/24/24 1132]   Temperature Heart Rate Respirations BP   36.5 °C (97.7 °F) 72 19 (!) 187/94      Pulse Ox Temp Source Heart Rate Source Patient Position   99 % Tympanic Monitor Sitting      BP Location FiO2 (%)     Right arm --       Physical Exam  Vitals and nursing note reviewed.   Constitutional:       Appearance: Normal appearance.   HENT:      Head: Normocephalic and atraumatic.      Nose: Nose normal.      Mouth/Throat:      Mouth: Mucous membranes are moist.   Eyes:      Extraocular Movements: Extraocular movements intact.      Pupils: Pupils are equal, round, and reactive to light.   Cardiovascular:      Rate and Rhythm: Normal rate and regular rhythm.   Pulmonary:      Effort: Pulmonary effort is normal.      Breath sounds: Normal breath sounds.   Abdominal:      General: Abdomen is flat.      Palpations: Abdomen is soft.   Musculoskeletal:         General: Normal range of motion.      Cervical back: Normal range of motion.   Skin:     General: Skin is warm and dry.      Capillary Refill: Capillary refill takes less than 2 seconds.   Neurological:      General: No focal deficit present.      Mental Status: She is alert.      Comments: NIH of 0   Psychiatric:         Mood and Affect: Mood normal.          ED Course & MDM   Diagnoses as of 01/24/24 1427   Hypertension, unspecified type       Medical Decision Making      Medical Decision Making: Patient was worked up with lab work which is reassuring.  CT head shows small vessel ischemic changes of the white matter.  At this time her blood pressure is improved and she will go home and follow-up with her primary care physician.  She has no symptoms currently.  Her NIH is 0.  [unfilled]     EKG interpreted by myself (ED attending physician): Heart rate of 73 normal sinus rhythm normal axis and intervals    Differential Diagnoses Considered: TIA hypertensive urgency hypertensive emergency.    Chronic Medical Conditions Significantly Affecting Care: N/A    External Records Reviewed: I reviewed recent and relevant outside records including: Previous labs and imaging    Social Determinants of Health Significantly Affecting Care: Lives with     Diagnostic testing considered: MRI brain but not indicated at this time    Adeola Calzada D.O.  Emergency Medicine          Procedure  Procedures     Adeola Calzada,   01/24/24 1428

## 2024-01-30 LAB
ATRIAL RATE: 73 BPM
P AXIS: 39 DEGREES
P OFFSET: 191 MS
P ONSET: 154 MS
PR INTERVAL: 130 MS
Q ONSET: 219 MS
QRS COUNT: 12 BEATS
QRS DURATION: 78 MS
QT INTERVAL: 416 MS
QTC CALCULATION(BAZETT): 458 MS
QTC FREDERICIA: 444 MS
R AXIS: 54 DEGREES
T AXIS: 60 DEGREES
T OFFSET: 427 MS
VENTRICULAR RATE: 73 BPM

## 2024-02-12 PROBLEM — R20.2 PARESTHESIA OF UPPER EXTREMITY: Status: ACTIVE | Noted: 2024-02-12

## 2024-02-12 PROBLEM — N95.2 POSTMENOPAUSAL ATROPHIC VAGINITIS: Status: ACTIVE | Noted: 2024-02-12

## 2024-02-12 PROBLEM — C73 PAPILLARY CARCINOMA OF THYROID (MULTI): Status: ACTIVE | Noted: 2023-09-08

## 2024-02-12 PROBLEM — N39.3 STRESS INCONTINENCE: Status: ACTIVE | Noted: 2024-02-12

## 2024-02-12 PROBLEM — E72.12 METHYLENETETRAHYDROFOLATE REDUCTASE (MTHFR) DEFICIENCY (MULTI): Status: ACTIVE | Noted: 2024-02-12

## 2024-02-12 PROBLEM — N81.3 THIRD DEGREE UTERINE PROLAPSE: Status: ACTIVE | Noted: 2023-09-08

## 2024-02-12 PROBLEM — M67.40 GANGLION, TENDON SHEATH: Status: RESOLVED | Noted: 2023-09-08 | Resolved: 2024-02-12

## 2024-02-12 PROBLEM — R22.30 MASS OF HAND: Status: RESOLVED | Noted: 2023-09-08 | Resolved: 2024-02-12

## 2024-02-12 PROBLEM — H04.123 DRY EYES: Status: ACTIVE | Noted: 2024-02-12

## 2024-02-12 PROBLEM — E28.39 DECREASED ESTROGEN LEVEL: Status: ACTIVE | Noted: 2024-02-12

## 2024-02-14 ENCOUNTER — HOSPITAL ENCOUNTER (OUTPATIENT)
Dept: RADIOLOGY | Facility: HOSPITAL | Age: 74
Discharge: HOME | End: 2024-02-14
Payer: MEDICARE

## 2024-02-14 ENCOUNTER — LAB (OUTPATIENT)
Dept: LAB | Facility: LAB | Age: 74
End: 2024-02-14
Payer: MEDICARE

## 2024-02-14 ENCOUNTER — OFFICE VISIT (OUTPATIENT)
Dept: PRIMARY CARE | Facility: CLINIC | Age: 74
End: 2024-02-14
Payer: MEDICARE

## 2024-02-14 VITALS
TEMPERATURE: 98.4 F | BODY MASS INDEX: 26.96 KG/M2 | SYSTOLIC BLOOD PRESSURE: 172 MMHG | HEIGHT: 69 IN | OXYGEN SATURATION: 98 % | WEIGHT: 182 LBS | DIASTOLIC BLOOD PRESSURE: 86 MMHG | HEART RATE: 74 BPM

## 2024-02-14 DIAGNOSIS — Z13.6 ENCOUNTER FOR SCREENING FOR CARDIOVASCULAR DISORDERS: ICD-10-CM

## 2024-02-14 DIAGNOSIS — Z12.31 ENCOUNTER FOR SCREENING MAMMOGRAM FOR MALIGNANT NEOPLASM OF BREAST: ICD-10-CM

## 2024-02-14 DIAGNOSIS — N39.41 URGE INCONTINENCE: ICD-10-CM

## 2024-02-14 DIAGNOSIS — E72.12 METHYLENETETRAHYDROFOLATE REDUCTASE (MTHFR) DEFICIENCY (MULTI): ICD-10-CM

## 2024-02-14 DIAGNOSIS — R73.9 HYPERGLYCEMIA: ICD-10-CM

## 2024-02-14 DIAGNOSIS — Z11.59 NEED FOR HEPATITIS C SCREENING TEST: ICD-10-CM

## 2024-02-14 DIAGNOSIS — E78.2 COMBINED HYPERLIPIDEMIA: ICD-10-CM

## 2024-02-14 DIAGNOSIS — Z00.00 ROUTINE GENERAL MEDICAL EXAMINATION AT HEALTH CARE FACILITY: ICD-10-CM

## 2024-02-14 DIAGNOSIS — Z00.00 ROUTINE GENERAL MEDICAL EXAMINATION AT HEALTH CARE FACILITY: Primary | ICD-10-CM

## 2024-02-14 PROBLEM — N81.3 COMPLETE UTEROVAGINAL PROLAPSE: Status: RESOLVED | Noted: 2023-09-08 | Resolved: 2024-02-14

## 2024-02-14 PROBLEM — N32.81 OVERACTIVE BLADDER: Status: RESOLVED | Noted: 2023-09-08 | Resolved: 2024-02-14

## 2024-02-14 PROBLEM — C73 CANCER OF THYROID (MULTI): Status: RESOLVED | Noted: 2023-09-08 | Resolved: 2024-02-14

## 2024-02-14 PROBLEM — R73.03 PREDIABETES: Status: RESOLVED | Noted: 2023-09-08 | Resolved: 2024-02-14

## 2024-02-14 PROBLEM — I10 HYPERTENSION: Status: RESOLVED | Noted: 2024-01-24 | Resolved: 2024-02-14

## 2024-02-14 PROBLEM — C73 PAPILLARY CARCINOMA OF THYROID (MULTI): Status: RESOLVED | Noted: 2023-09-08 | Resolved: 2024-02-14

## 2024-02-14 PROBLEM — E28.39 HYPOESTROGENISM: Status: RESOLVED | Noted: 2023-09-08 | Resolved: 2024-02-14

## 2024-02-14 PROBLEM — E78.00 ELEVATED CHOLESTEROL: Status: RESOLVED | Noted: 2023-09-08 | Resolved: 2024-02-14

## 2024-02-14 PROBLEM — N39.3 STRESS INCONTINENCE: Status: RESOLVED | Noted: 2024-02-12 | Resolved: 2024-02-14

## 2024-02-14 LAB
ALBUMIN SERPL BCP-MCNC: 4.3 G/DL (ref 3.4–5)
ALP SERPL-CCNC: 70 U/L (ref 33–136)
ALT SERPL W P-5'-P-CCNC: 24 U/L (ref 7–45)
ANION GAP SERPL CALC-SCNC: 12 MMOL/L (ref 10–20)
AST SERPL W P-5'-P-CCNC: 20 U/L (ref 9–39)
BILIRUB SERPL-MCNC: 0.7 MG/DL (ref 0–1.2)
BUN SERPL-MCNC: 18 MG/DL (ref 6–23)
CALCIUM SERPL-MCNC: 9.3 MG/DL (ref 8.6–10.3)
CHLORIDE SERPL-SCNC: 103 MMOL/L (ref 98–107)
CHOLEST SERPL-MCNC: 223 MG/DL (ref 0–199)
CHOLESTEROL/HDL RATIO: 3
CO2 SERPL-SCNC: 29 MMOL/L (ref 21–32)
CREAT SERPL-MCNC: 0.62 MG/DL (ref 0.5–1.05)
EGFRCR SERPLBLD CKD-EPI 2021: >90 ML/MIN/1.73M*2
ERYTHROCYTE [DISTWIDTH] IN BLOOD BY AUTOMATED COUNT: 13.8 % (ref 11.5–14.5)
EST. AVERAGE GLUCOSE BLD GHB EST-MCNC: 123 MG/DL
GLUCOSE SERPL-MCNC: 98 MG/DL (ref 74–99)
HBA1C MFR BLD: 5.9 %
HCT VFR BLD AUTO: 43 % (ref 36–46)
HCV AB SER QL: NONREACTIVE
HDLC SERPL-MCNC: 74.5 MG/DL
HGB BLD-MCNC: 14.2 G/DL (ref 12–16)
LDLC SERPL CALC-MCNC: 136 MG/DL
MCH RBC QN AUTO: 31.3 PG (ref 26–34)
MCHC RBC AUTO-ENTMCNC: 33 G/DL (ref 32–36)
MCV RBC AUTO: 95 FL (ref 80–100)
NON HDL CHOLESTEROL: 149 MG/DL (ref 0–149)
NRBC BLD-RTO: 0 /100 WBCS (ref 0–0)
PLATELET # BLD AUTO: 243 X10*3/UL (ref 150–450)
POTASSIUM SERPL-SCNC: 3.8 MMOL/L (ref 3.5–5.3)
PROT SERPL-MCNC: 6.9 G/DL (ref 6.4–8.2)
RBC # BLD AUTO: 4.54 X10*6/UL (ref 4–5.2)
SODIUM SERPL-SCNC: 140 MMOL/L (ref 136–145)
T4 FREE SERPL-MCNC: 1.13 NG/DL (ref 0.61–1.12)
TRIGL SERPL-MCNC: 61 MG/DL (ref 0–149)
TSH SERPL-ACNC: 0.96 MIU/L (ref 0.44–3.98)
VLDL: 12 MG/DL (ref 0–40)
WBC # BLD AUTO: 6.7 X10*3/UL (ref 4.4–11.3)

## 2024-02-14 PROCEDURE — 99213 OFFICE O/P EST LOW 20 MIN: CPT | Performed by: FAMILY MEDICINE

## 2024-02-14 PROCEDURE — 86803 HEPATITIS C AB TEST: CPT

## 2024-02-14 PROCEDURE — 36415 COLL VENOUS BLD VENIPUNCTURE: CPT

## 2024-02-14 PROCEDURE — 80061 LIPID PANEL: CPT

## 2024-02-14 PROCEDURE — 1036F TOBACCO NON-USER: CPT | Performed by: FAMILY MEDICINE

## 2024-02-14 PROCEDURE — G0439 PPPS, SUBSEQ VISIT: HCPCS | Performed by: FAMILY MEDICINE

## 2024-02-14 PROCEDURE — 1158F ADVNC CARE PLAN TLK DOCD: CPT | Performed by: FAMILY MEDICINE

## 2024-02-14 PROCEDURE — 1159F MED LIST DOCD IN RCRD: CPT | Performed by: FAMILY MEDICINE

## 2024-02-14 PROCEDURE — 80053 COMPREHEN METABOLIC PANEL: CPT

## 2024-02-14 PROCEDURE — 77067 SCR MAMMO BI INCL CAD: CPT

## 2024-02-14 PROCEDURE — 1170F FXNL STATUS ASSESSED: CPT | Performed by: FAMILY MEDICINE

## 2024-02-14 PROCEDURE — 77063 BREAST TOMOSYNTHESIS BI: CPT | Performed by: RADIOLOGY

## 2024-02-14 PROCEDURE — 1123F ACP DISCUSS/DSCN MKR DOCD: CPT | Performed by: FAMILY MEDICINE

## 2024-02-14 PROCEDURE — 1126F AMNT PAIN NOTED NONE PRSNT: CPT | Performed by: FAMILY MEDICINE

## 2024-02-14 PROCEDURE — 1160F RVW MEDS BY RX/DR IN RCRD: CPT | Performed by: FAMILY MEDICINE

## 2024-02-14 PROCEDURE — 84443 ASSAY THYROID STIM HORMONE: CPT

## 2024-02-14 PROCEDURE — 85027 COMPLETE CBC AUTOMATED: CPT

## 2024-02-14 PROCEDURE — 1157F ADVNC CARE PLAN IN RCRD: CPT | Performed by: FAMILY MEDICINE

## 2024-02-14 PROCEDURE — 84439 ASSAY OF FREE THYROXINE: CPT

## 2024-02-14 PROCEDURE — 77067 SCR MAMMO BI INCL CAD: CPT | Performed by: RADIOLOGY

## 2024-02-14 PROCEDURE — 83036 HEMOGLOBIN GLYCOSYLATED A1C: CPT

## 2024-02-14 ASSESSMENT — ACTIVITIES OF DAILY LIVING (ADL)
DRESSING: INDEPENDENT
BATHING: INDEPENDENT
MANAGING_FINANCES: INDEPENDENT
DOING_HOUSEWORK: INDEPENDENT
GROCERY_SHOPPING: INDEPENDENT
TAKING_MEDICATION: INDEPENDENT

## 2024-02-14 ASSESSMENT — PATIENT HEALTH QUESTIONNAIRE - PHQ9
2. FEELING DOWN, DEPRESSED OR HOPELESS: NOT AT ALL
SUM OF ALL RESPONSES TO PHQ9 QUESTIONS 1 AND 2: 0
1. LITTLE INTEREST OR PLEASURE IN DOING THINGS: NOT AT ALL

## 2024-02-14 NOTE — PATIENT INSTRUCTIONS
Get your blood work as ordered.  You should hear from our office with results whether they are normal are not within a few days.  Please call the office if you do not hear from us.     After you get testing done you will get notified from our office with regards to your results whether they are normal or not.  If you are registered in the electronic health record we will send you information in that system.  If you have any questions or need clarification please feel free to call the office.       You should be getting cardiovascular exercise 3-5 times per week for 30-45 minutes.  This includes exercises such as running, brisk walking, biking or swimming.     It is recommended that you check your blood pressure at least twice per month, this can be done on a home machine or at the drugstore grocery store.  your blood pressure should be less than 140/90  You should follow a low-salt diet, get regular exercise, keep your weight under control.    Low salt diet     Coronary calcium

## 2024-02-16 NOTE — RESULT ENCOUNTER NOTE
Labs generally look okay: Very slightly elevated sugar very slightly borderline cholesterol, continue to stay active eat healthy diet  Thyroid dose is okay a little borderline we will continue same and repeat in 6 months  Rest of labs normal  I would suggest coming in again in the next month for a nurse visit to recheck your blood pressure

## 2024-02-22 ENCOUNTER — TELEPHONE (OUTPATIENT)
Dept: OBSTETRICS AND GYNECOLOGY | Facility: CLINIC | Age: 74
End: 2024-02-22
Payer: MEDICARE

## 2024-02-22 DIAGNOSIS — L90.0 LICHEN SCLEROSUS ET ATROPHICUS: ICD-10-CM

## 2024-02-22 RX ORDER — BETAMETHASONE DIPROPIONATE 0.5 MG/G
1 CREAM TOPICAL DAILY
Qty: 45 G | Refills: 1 | Status: SHIPPED | OUTPATIENT
Start: 2024-02-22

## 2024-02-23 ENCOUNTER — TELEPHONE (OUTPATIENT)
Dept: OBSTETRICS AND GYNECOLOGY | Facility: CLINIC | Age: 74
End: 2024-02-23
Payer: MEDICARE

## 2024-02-27 ENCOUNTER — APPOINTMENT (OUTPATIENT)
Dept: ENDOCRINOLOGY | Facility: CLINIC | Age: 74
End: 2024-02-27
Payer: MEDICARE

## 2024-03-01 ENCOUNTER — APPOINTMENT (OUTPATIENT)
Dept: RADIOLOGY | Facility: HOSPITAL | Age: 74
End: 2024-03-01
Payer: MEDICARE

## 2024-04-02 DIAGNOSIS — E03.9 HYPOTHYROIDISM, UNSPECIFIED TYPE: ICD-10-CM

## 2024-04-02 RX ORDER — LEVOTHYROXINE SODIUM 112 UG/1
112 TABLET ORAL DAILY
Qty: 90 TABLET | Refills: 1 | OUTPATIENT
Start: 2024-04-02

## 2024-04-03 DIAGNOSIS — E03.9 HYPOTHYROIDISM, UNSPECIFIED TYPE: ICD-10-CM

## 2024-04-03 RX ORDER — LEVOTHYROXINE SODIUM 112 UG/1
112 TABLET ORAL DAILY
Qty: 90 TABLET | Refills: 2 | Status: SHIPPED | OUTPATIENT
Start: 2024-04-03

## 2024-04-10 ENCOUNTER — CLINICAL SUPPORT (OUTPATIENT)
Dept: PRIMARY CARE | Facility: CLINIC | Age: 74
End: 2024-04-10
Payer: MEDICARE

## 2024-04-10 VITALS — DIASTOLIC BLOOD PRESSURE: 78 MMHG | SYSTOLIC BLOOD PRESSURE: 128 MMHG

## 2024-04-10 NOTE — PROGRESS NOTES
Pt in for bp check.  Pt numbers at home per pt are running approx 112/80.  BP today manually taken.  128/78.

## 2024-04-18 ENCOUNTER — APPOINTMENT (OUTPATIENT)
Dept: OBSTETRICS AND GYNECOLOGY | Facility: CLINIC | Age: 74
End: 2024-04-18
Payer: MEDICARE

## 2024-05-06 ENCOUNTER — APPOINTMENT (OUTPATIENT)
Dept: RADIOLOGY | Facility: HOSPITAL | Age: 74
End: 2024-05-06
Payer: MEDICARE

## 2024-07-31 ENCOUNTER — APPOINTMENT (OUTPATIENT)
Dept: RADIOLOGY | Facility: HOSPITAL | Age: 74
End: 2024-07-31
Payer: MEDICARE

## 2024-08-15 ENCOUNTER — APPOINTMENT (OUTPATIENT)
Dept: OBSTETRICS AND GYNECOLOGY | Facility: CLINIC | Age: 74
End: 2024-08-15
Payer: MEDICARE

## 2024-08-27 ENCOUNTER — TELEPHONE (OUTPATIENT)
Dept: OBSTETRICS AND GYNECOLOGY | Facility: CLINIC | Age: 74
End: 2024-08-27
Payer: MEDICARE

## 2024-08-27 NOTE — TELEPHONE ENCOUNTER
Contacted pt and verified name and .  Pt state when she spoke to Dr. Baig 2 years ago she was offered the procedure at a discounted price and wants to know if those terms still apply.   Patient states understanding and has no questions at this time.

## 2024-08-28 NOTE — TELEPHONE ENCOUNTER
----- Message from Thuan Mirza sent at 8/27/2024  3:11 PM EDT -----  This patient is interested in scheduling a DIVA with me

## 2024-08-28 NOTE — TELEPHONE ENCOUNTER
Pt returned call and verified name and .  Pt notified that in order to get the Diva at a discount she would have to be a  employee, have former hx of breast cancer or pay for the procedure in full. Pt made aware that what she discussed with her provider 2 years ago is no longer an option. Pt decided she wants to be seen to find out if she is a candidate for the procedure. She is scheduled for 24 with Thuan Mirza.  Patient states understanding and has no questions at this time.

## 2024-09-03 ENCOUNTER — LAB (OUTPATIENT)
Dept: LAB | Facility: LAB | Age: 74
End: 2024-09-03
Payer: MEDICARE

## 2024-09-03 ENCOUNTER — APPOINTMENT (OUTPATIENT)
Dept: PRIMARY CARE | Facility: CLINIC | Age: 74
End: 2024-09-03
Payer: MEDICARE

## 2024-09-03 VITALS
BODY MASS INDEX: 26.07 KG/M2 | DIASTOLIC BLOOD PRESSURE: 81 MMHG | SYSTOLIC BLOOD PRESSURE: 158 MMHG | OXYGEN SATURATION: 68 % | HEART RATE: 70 BPM | HEIGHT: 69 IN | WEIGHT: 176 LBS

## 2024-09-03 DIAGNOSIS — L57.8 PHOTOAGING OF SKIN: ICD-10-CM

## 2024-09-03 DIAGNOSIS — E55.9 VITAMIN D DEFICIENCY, UNSPECIFIED: ICD-10-CM

## 2024-09-03 DIAGNOSIS — R79.89 LOW VITAMIN D LEVEL: ICD-10-CM

## 2024-09-03 DIAGNOSIS — E03.9 ACQUIRED HYPOTHYROIDISM: ICD-10-CM

## 2024-09-03 DIAGNOSIS — E78.2 COMBINED HYPERLIPIDEMIA: ICD-10-CM

## 2024-09-03 DIAGNOSIS — N95.2 POSTMENOPAUSAL ATROPHIC VAGINITIS: ICD-10-CM

## 2024-09-03 DIAGNOSIS — R01.1 HEART MURMUR: ICD-10-CM

## 2024-09-03 DIAGNOSIS — E78.2 COMBINED HYPERLIPIDEMIA: Primary | ICD-10-CM

## 2024-09-03 DIAGNOSIS — L90.0 LICHEN SCLEROSUS ET ATROPHICUS: ICD-10-CM

## 2024-09-03 DIAGNOSIS — Z15.89 MTHFR MUTATION: ICD-10-CM

## 2024-09-03 LAB
ANION GAP SERPL CALC-SCNC: 10 MMOL/L (ref 10–20)
BASOPHILS # BLD AUTO: 0.08 X10*3/UL (ref 0–0.1)
BASOPHILS NFR BLD AUTO: 1.4 %
BUN SERPL-MCNC: 18 MG/DL (ref 6–23)
CALCIUM SERPL-MCNC: 9.2 MG/DL (ref 8.6–10.3)
CHLORIDE SERPL-SCNC: 104 MMOL/L (ref 98–107)
CO2 SERPL-SCNC: 31 MMOL/L (ref 21–32)
CREAT SERPL-MCNC: 0.7 MG/DL (ref 0.5–1.05)
EGFRCR SERPLBLD CKD-EPI 2021: 90 ML/MIN/1.73M*2
EOSINOPHIL # BLD AUTO: 0.35 X10*3/UL (ref 0–0.4)
EOSINOPHIL NFR BLD AUTO: 6 %
ERYTHROCYTE [DISTWIDTH] IN BLOOD BY AUTOMATED COUNT: 13.8 % (ref 11.5–14.5)
GLUCOSE SERPL-MCNC: 94 MG/DL (ref 74–99)
HCT VFR BLD AUTO: 43.7 % (ref 36–46)
HGB BLD-MCNC: 14.4 G/DL (ref 12–16)
IMM GRANULOCYTES # BLD AUTO: 0.01 X10*3/UL (ref 0–0.5)
IMM GRANULOCYTES NFR BLD AUTO: 0.2 % (ref 0–0.9)
LYMPHOCYTES # BLD AUTO: 1.61 X10*3/UL (ref 0.8–3)
LYMPHOCYTES NFR BLD AUTO: 27.5 %
MCH RBC QN AUTO: 31.3 PG (ref 26–34)
MCHC RBC AUTO-ENTMCNC: 33 G/DL (ref 32–36)
MCV RBC AUTO: 95 FL (ref 80–100)
MONOCYTES # BLD AUTO: 0.54 X10*3/UL (ref 0.05–0.8)
MONOCYTES NFR BLD AUTO: 9.2 %
NEUTROPHILS # BLD AUTO: 3.27 X10*3/UL (ref 1.6–5.5)
NEUTROPHILS NFR BLD AUTO: 55.7 %
NRBC BLD-RTO: 0 /100 WBCS (ref 0–0)
PLATELET # BLD AUTO: 265 X10*3/UL (ref 150–450)
POTASSIUM SERPL-SCNC: 4.1 MMOL/L (ref 3.5–5.3)
RBC # BLD AUTO: 4.6 X10*6/UL (ref 4–5.2)
SODIUM SERPL-SCNC: 141 MMOL/L (ref 136–145)
T4 FREE SERPL-MCNC: 1.05 NG/DL (ref 0.61–1.12)
WBC # BLD AUTO: 5.9 X10*3/UL (ref 4.4–11.3)

## 2024-09-03 PROCEDURE — 82306 VITAMIN D 25 HYDROXY: CPT

## 2024-09-03 PROCEDURE — 1036F TOBACCO NON-USER: CPT | Performed by: FAMILY MEDICINE

## 2024-09-03 PROCEDURE — G2211 COMPLEX E/M VISIT ADD ON: HCPCS | Performed by: FAMILY MEDICINE

## 2024-09-03 PROCEDURE — 1157F ADVNC CARE PLAN IN RCRD: CPT | Performed by: FAMILY MEDICINE

## 2024-09-03 PROCEDURE — 36415 COLL VENOUS BLD VENIPUNCTURE: CPT

## 2024-09-03 PROCEDURE — 1160F RVW MEDS BY RX/DR IN RCRD: CPT | Performed by: FAMILY MEDICINE

## 2024-09-03 PROCEDURE — 99214 OFFICE O/P EST MOD 30 MIN: CPT | Performed by: FAMILY MEDICINE

## 2024-09-03 PROCEDURE — 1159F MED LIST DOCD IN RCRD: CPT | Performed by: FAMILY MEDICINE

## 2024-09-03 PROCEDURE — 80048 BASIC METABOLIC PNL TOTAL CA: CPT

## 2024-09-03 PROCEDURE — 85025 COMPLETE CBC W/AUTO DIFF WBC: CPT

## 2024-09-03 PROCEDURE — 84439 ASSAY OF FREE THYROXINE: CPT

## 2024-09-03 RX ORDER — TRETINOIN 0.5 MG/G
CREAM TOPICAL NIGHTLY
Qty: 20 G | Refills: 5 | Status: SHIPPED | OUTPATIENT
Start: 2024-09-03 | End: 2025-09-03

## 2024-09-03 RX ORDER — BETAMETHASONE DIPROPIONATE 0.5 MG/G
1 CREAM TOPICAL DAILY
Qty: 45 G | Refills: 1 | Status: SHIPPED | OUTPATIENT
Start: 2024-09-03

## 2024-09-03 RX ORDER — TRETINOIN 0.5 MG/G
CREAM TOPICAL NIGHTLY
Qty: 20 G | Refills: 5 | Status: SHIPPED | OUTPATIENT
Start: 2024-09-03 | End: 2024-09-03

## 2024-09-03 ASSESSMENT — PATIENT HEALTH QUESTIONNAIRE - PHQ9
2. FEELING DOWN, DEPRESSED OR HOPELESS: NOT AT ALL
1. LITTLE INTEREST OR PLEASURE IN DOING THINGS: NOT AT ALL
SUM OF ALL RESPONSES TO PHQ9 QUESTIONS 1 AND 2: 0

## 2024-09-03 ASSESSMENT — ENCOUNTER SYMPTOMS: SHORTNESS OF BREATH: 0

## 2024-09-03 NOTE — PATIENT INSTRUCTIONS
Get your blood work as ordered.  You should hear from our office with results whether they are normal are not within a few days.  Please call the office if you do not hear from us.     Echo     You should be getting cardiovascular exercise 3-5 times per week for 30-45 minutes.  This includes exercises such as running, brisk walking, biking or swimming.     You have osteoarthritis which is the wear and tear arthritis that we see as people age.  Most people get arthritis as they get older.  Typically we see this arthritis more substantially in the larger joints in the body such as hips, knees, back, shoulders.  Exercising can help with arthritic pain.  It is good to keep your weight down.  This type of arthritis is not reversible.  There are things you can take to treat the symptoms.  Sometimes some over-the-counter joint supplements like glucosamine, chondroitin, Osteo Bi-Flex can help.  Turmeric over-the-counter can help with inflammation  If you are able to take anti-inflammatories such as Advil, Aleve these can be helpful.  Tylenol can help somewhat with the pain also.  Some people get benefit from topical medication such as lidocaine or Voltaren gel  which are both over-the-counter.  Also topical medications can help with pain ; such as topical Lidocaine or topical Voltaren

## 2024-09-03 NOTE — PROGRESS NOTES
"Subjective   Patient ID: Marcelle Shelton is a 74 y.o. female who presents for Follow-up. Would like her thyroid level checked. Pt also requesting an increase in the Retinol dose. Last concern pt has is regarding her vaginal / bladder health. States she saw Dr. Baig and they discussed Venus Rejuvenation treatment. Pt is on the fence with regards to doing so; was going to be part of a clinical study but Dr. Baig decided not to learn this type of treatment; was referred to Dr. Thuan Palomino. Pt also has arthritis in her knees and wonders if being exposed to radiation may worsen this?    Diaastolic a little high 110-130/ 70-90      Hypothyroidism: Energy level has been stable, weight has been stable, patient is tolerating medication well  hx of cancer of thyroid, 2014: s/p excision, full excision:  Some dry skin     Biking some   Some knee arthritis       Estrogen cream   Urge incontinence:   Had suggested ablation / venus therapy      No history of blood clots  MthFR     No known history of heart murmurs    Uses Retin-A for cosmetic reasons would like a higher dose         Review of Systems   Respiratory:  Negative for shortness of breath.    Cardiovascular:  Negative for chest pain.       Objective   /81   Pulse 70   Ht 1.753 m (5' 9\")   Wt 79.8 kg (176 lb)   SpO2 (!) 68%   BMI 25.99 kg/m²     Physical Exam  Constitutional:       Appearance: Normal appearance. She is well-developed.   Cardiovascular:      Rate and Rhythm: Normal rate and regular rhythm.      Heart sounds: Murmur heard.      Comments: 3 out of 6 systolic ejection murmur  Pulmonary:      Effort: Pulmonary effort is normal.      Breath sounds: Normal breath sounds.   Neurological:      General: No focal deficit present.      Mental Status: She is alert.   Psychiatric:         Mood and Affect: Mood normal.         Behavior: Behavior normal.         Assessment/Plan   Problem List Items Addressed This Visit             ICD-10-CM    Combined " hyperlipidemia - Primary E78.2    Relevant Orders    Thyroxine, Free    Basic Metabolic Panel    CBC and Auto Differential    Vitamin D 25-Hydroxy,Total (for eval of Vitamin D levels)    Hypothyroidism E03.9    Relevant Orders    Thyroxine, Free    Basic Metabolic Panel    CBC and Auto Differential    Vitamin D 25-Hydroxy,Total (for eval of Vitamin D levels)    Low vitamin D level R79.89    Relevant Orders    Thyroxine, Free    Basic Metabolic Panel    CBC and Auto Differential    Vitamin D 25-Hydroxy,Total (for eval of Vitamin D levels)    MTHFR mutation Z15.89    Relevant Orders    Thyroxine, Free    Basic Metabolic Panel    CBC and Auto Differential    Vitamin D 25-Hydroxy,Total (for eval of Vitamin D levels)    Postmenopausal atrophic vaginitis N95.2    Relevant Orders    Thyroxine, Free    Basic Metabolic Panel    CBC and Auto Differential    Vitamin D 25-Hydroxy,Total (for eval of Vitamin D levels)     Other Visit Diagnoses         Codes    Lichen sclerosus et atrophicus     L90.0    Relevant Medications    betamethasone dipropionate 0.05 % cream    Other Relevant Orders    Thyroxine, Free    Basic Metabolic Panel    CBC and Auto Differential    Vitamin D 25-Hydroxy,Total (for eval of Vitamin D levels)    Photoaging of skin     L57.8    Relevant Medications    tretinoin (Retin-A) 0.05 % cream    Other Relevant Orders    Thyroxine, Free    Basic Metabolic Panel    CBC and Auto Differential    Vitamin D 25-Hydroxy,Total (for eval of Vitamin D levels)    Heart murmur     R01.1    Relevant Orders    Transthoracic Echo Complete    CBC and Auto Differential    Vitamin D 25-Hydroxy,Total (for eval of Vitamin D levels)    Vitamin D deficiency, unspecified     E55.9    Relevant Orders    Vitamin D 25-Hydroxy,Total (for eval of Vitamin D levels)

## 2024-09-04 LAB — 25(OH)D3 SERPL-MCNC: 32 NG/ML (ref 30–100)

## 2024-09-26 ENCOUNTER — HOSPITAL ENCOUNTER (OUTPATIENT)
Dept: CARDIOLOGY | Facility: HOSPITAL | Age: 74
Discharge: HOME | End: 2024-09-26
Payer: MEDICARE

## 2024-09-26 DIAGNOSIS — R01.1 HEART MURMUR: ICD-10-CM

## 2024-09-26 PROCEDURE — 93306 TTE W/DOPPLER COMPLETE: CPT

## 2024-10-08 ENCOUNTER — APPOINTMENT (OUTPATIENT)
Dept: RADIOLOGY | Facility: HOSPITAL | Age: 74
End: 2024-10-08
Payer: MEDICARE

## 2024-10-10 LAB
AORTIC VALVE MEAN GRADIENT: 6 MMHG
AORTIC VALVE PEAK VELOCITY: 1.61 M/S
AV PEAK GRADIENT: 10.4 MMHG
AVA (PEAK VEL): 2.54 CM2
AVA (VTI): 2.24 CM2
EJECTION FRACTION APICAL 4 CHAMBER: 60.6
EJECTION FRACTION: 58 %
LEFT ATRIUM VOLUME AREA LENGTH INDEX BSA: 22.8 ML/M2
LEFT VENTRICLE INTERNAL DIMENSION DIASTOLE: 3.83 CM (ref 3.5–6)
LEFT VENTRICULAR OUTFLOW TRACT DIAMETER: 2 CM
MITRAL VALVE E/A RATIO: 1.1
RIGHT VENTRICLE FREE WALL PEAK S': 12.8 CM/S
TRICUSPID ANNULAR PLANE SYSTOLIC EXCURSION: 2.5 CM

## 2024-10-15 ENCOUNTER — APPOINTMENT (OUTPATIENT)
Dept: PRIMARY CARE | Facility: CLINIC | Age: 74
End: 2024-10-15
Payer: MEDICARE

## 2024-11-19 ENCOUNTER — TELEPHONE (OUTPATIENT)
Dept: OBSTETRICS AND GYNECOLOGY | Facility: CLINIC | Age: 74
End: 2024-11-19

## 2024-11-19 ENCOUNTER — APPOINTMENT (OUTPATIENT)
Dept: OBSTETRICS AND GYNECOLOGY | Facility: CLINIC | Age: 74
End: 2024-11-19
Payer: MEDICARE

## 2024-11-19 VITALS
HEIGHT: 69 IN | SYSTOLIC BLOOD PRESSURE: 132 MMHG | DIASTOLIC BLOOD PRESSURE: 88 MMHG | WEIGHT: 180.8 LBS | BODY MASS INDEX: 26.78 KG/M2

## 2024-11-19 DIAGNOSIS — R10.2 VULVAR PAIN: ICD-10-CM

## 2024-11-19 DIAGNOSIS — L90.0 LICHEN SCLEROSUS: Primary | ICD-10-CM

## 2024-11-19 PROCEDURE — 1159F MED LIST DOCD IN RCRD: CPT | Performed by: NURSE PRACTITIONER

## 2024-11-19 PROCEDURE — 1126F AMNT PAIN NOTED NONE PRSNT: CPT | Performed by: NURSE PRACTITIONER

## 2024-11-19 PROCEDURE — 1157F ADVNC CARE PLAN IN RCRD: CPT | Performed by: NURSE PRACTITIONER

## 2024-11-19 PROCEDURE — 99214 OFFICE O/P EST MOD 30 MIN: CPT | Performed by: NURSE PRACTITIONER

## 2024-11-19 PROCEDURE — 87102 FUNGUS ISOLATION CULTURE: CPT

## 2024-11-19 RX ORDER — CLOBETASOL PROPIONATE 0.5 MG/G
OINTMENT TOPICAL
Qty: 45 G | Refills: 1 | Status: SHIPPED | OUTPATIENT
Start: 2024-11-19

## 2024-11-19 ASSESSMENT — PAIN SCALES - GENERAL: PAINLEVEL_OUTOF10: 0-NO PAIN

## 2024-11-19 NOTE — PROGRESS NOTES
Subjective   Patient ID: Marcelle Shelton is a 74 y.o. female who presents for No chief complaint on file..  HPI  Would like to discuss the vaginal laser as an option for vulvar irritation; denies any vaginal irritation/pain  Working with Dr Baig for incontinence  Wears a pad daily which causes vulvar irritation; once a month she applies betamethasone d/t irritation which is mostly effective  Vaginal hygiene: soap  No h/o vaginal estrogen   Age at menopause 48  Sexually active with , +vaginal dryness but no dyspareunia but she is not having penetrative intercourse    Currently applying progesterone cream daily, a different clinician recommended it for health in menopause  Has been applying it for 20 years      Review of Systems    Objective   Physical Exam  Genitourinary:         Comments: Erythema throughout the vulva, perineum, and around the anus; whitening anterior to the clitoral glans, no lesions  Fissures near the anus  Tissue appears very dry        Assessment/Plan   Diagnoses and all orders for this visit:  Lichen sclerosus  -     clobetasol (Temovate) 0.05 % ointment; Apply a chocolate chip sized amount to the vulva, perineum, and around the anus nightly  Vulvar pain  -     Fungal Screen, Yeast       Pt states she was unaware of a previous diagnosis of LS and has not been using clobetasol  Per Dr. Baig's note pt has been on vaginal estrogen but the patient states she has never been on vaginal estrogen    We discussed clobetasol daily for the next 6-8 weeks at night after she showers; every morning she will apply a barrier to protect the vulva from CD from the pad and urine; sample of Eucerin given to her    At her follow up I plan to add in a vaginal estrogen and decrease her clobetasol to a maintenance dose of 2-3 times/week pending her exam  We discussed that the DIVA vaginal laser is helpful for vaginal symptoms of GSM but her symptoms are only the vulva and most likely d/t LS, GSM, and  potentially CD    I advised her to stop the topical progesterone d/t lack of data on its safety    Thuan Mirza, OPAL-CNP 11/19/24 11:02 AM

## 2024-11-19 NOTE — LETTER
November 19, 2024     Nadia Baig MD  [address] [facility]   [facility], [address]  [facility] [address]    Patient: Marcelle Shelton   YOB: 1950   Date of Visit: 11/19/2024       Dear Dr. Nadia Baig MD:    Thank you for referring Marcelle Shelton to me for evaluation. Below are my notes for this consultation.  If you have questions, please do not hesitate to call me. I look forward to following your patient along with you.       Sincerely,     Thuan Mirza, APRN-CNP      CC: No Recipients  ______________________________________________________________________________________    Subjective  Patient ID: Marcelle Shelton is a 74 y.o. female who presents for No chief complaint on file..  HPI  Would like to discuss the vaginal laser as an option for vulvar irritation; denies any vaginal irritation/pain  Working with Dr Baig for incontinence  Wears a pad daily which causes vulvar irritation; once a month she applies betamethasone d/t irritation which is mostly effective  Vaginal hygiene: soap  No h/o vaginal estrogen   Age at menopause 48  Sexually active with , +vaginal dryness but no dyspareunia but she is not having penetrative intercourse    Currently applying progesterone cream daily, a different clinician recommended it for health in menopause  Has been applying it for 20 years      Review of Systems    Objective  Physical Exam  Genitourinary:         Comments: Erythema throughout the vulva, perineum, and around the anus; whitening anterior to the clitoral glans, no lesions  Fissures near the anus  Tissue appears very dry        Assessment/Plan  Diagnoses and all orders for this visit:  Lichen sclerosus  -     clobetasol (Temovate) 0.05 % ointment; Apply a chocolate chip sized amount to the vulva, perineum, and around the anus nightly  Vulvar pain  -     Fungal Screen, Yeast       Pt states she was unaware of a previous diagnosis of LS and has not been  using clobetasol  Per Dr. Baig's note pt has been on vaginal estrogen but the patient states she has never been on vaginal estrogen    We discussed clobetasol daily for the next 6-8 weeks at night after she showers; every morning she will apply a barrier to protect the vulva from CD from the pad and urine; sample of Eucerin given to her    At her follow up I plan to add in a vaginal estrogen and decrease her clobetasol to a maintenance dose of 2-3 times/week pending her exam  We discussed that the DIVA vaginal laser is helpful for vaginal symptoms of GSM but her symptoms are only the vulva and most likely d/t LS, GSM, and potentially CD    OPAL Petty-CNP 11/19/24 11:02 AM

## 2024-11-23 LAB — YEAST SPEC QL CULT: NORMAL

## 2024-12-22 ENCOUNTER — HOSPITAL ENCOUNTER (EMERGENCY)
Facility: HOSPITAL | Age: 74
Discharge: HOME | End: 2024-12-22
Attending: STUDENT IN AN ORGANIZED HEALTH CARE EDUCATION/TRAINING PROGRAM
Payer: MEDICARE

## 2024-12-22 ENCOUNTER — APPOINTMENT (OUTPATIENT)
Dept: RADIOLOGY | Facility: HOSPITAL | Age: 74
End: 2024-12-22
Payer: MEDICARE

## 2024-12-22 VITALS
HEIGHT: 70 IN | WEIGHT: 180 LBS | OXYGEN SATURATION: 99 % | HEART RATE: 88 BPM | DIASTOLIC BLOOD PRESSURE: 88 MMHG | TEMPERATURE: 98.8 F | BODY MASS INDEX: 25.77 KG/M2 | RESPIRATION RATE: 20 BRPM | SYSTOLIC BLOOD PRESSURE: 149 MMHG

## 2024-12-22 DIAGNOSIS — I10 HYPERTENSION, UNSPECIFIED TYPE: Primary | ICD-10-CM

## 2024-12-22 LAB
ALBUMIN SERPL BCP-MCNC: 4.2 G/DL (ref 3.4–5)
ALP SERPL-CCNC: 60 U/L (ref 33–136)
ALT SERPL W P-5'-P-CCNC: 20 U/L (ref 7–45)
ANION GAP SERPL CALC-SCNC: 17 MMOL/L (ref 10–20)
AST SERPL W P-5'-P-CCNC: 19 U/L (ref 9–39)
BASOPHILS # BLD AUTO: 0.05 X10*3/UL (ref 0–0.1)
BASOPHILS NFR BLD AUTO: 0.7 %
BILIRUB SERPL-MCNC: 0.5 MG/DL (ref 0–1.2)
BUN SERPL-MCNC: 16 MG/DL (ref 6–23)
CALCIUM SERPL-MCNC: 9.2 MG/DL (ref 8.6–10.3)
CARDIAC TROPONIN I PNL SERPL HS: <3 NG/L (ref 0–13)
CHLORIDE SERPL-SCNC: 102 MMOL/L (ref 98–107)
CO2 SERPL-SCNC: 28 MMOL/L (ref 21–32)
CREAT SERPL-MCNC: 0.62 MG/DL (ref 0.5–1.05)
EGFRCR SERPLBLD CKD-EPI 2021: >90 ML/MIN/1.73M*2
EOSINOPHIL # BLD AUTO: 0.22 X10*3/UL (ref 0–0.4)
EOSINOPHIL NFR BLD AUTO: 3.1 %
ERYTHROCYTE [DISTWIDTH] IN BLOOD BY AUTOMATED COUNT: 13.6 % (ref 11.5–14.5)
GLUCOSE SERPL-MCNC: 107 MG/DL (ref 74–99)
HCT VFR BLD AUTO: 39.5 % (ref 36–46)
HGB BLD-MCNC: 13.4 G/DL (ref 12–16)
IMM GRANULOCYTES # BLD AUTO: 0.03 X10*3/UL (ref 0–0.5)
IMM GRANULOCYTES NFR BLD AUTO: 0.4 % (ref 0–0.9)
LYMPHOCYTES # BLD AUTO: 1.34 X10*3/UL (ref 0.8–3)
LYMPHOCYTES NFR BLD AUTO: 18.6 %
MCH RBC QN AUTO: 31.4 PG (ref 26–34)
MCHC RBC AUTO-ENTMCNC: 33.9 G/DL (ref 32–36)
MCV RBC AUTO: 93 FL (ref 80–100)
MONOCYTES # BLD AUTO: 0.46 X10*3/UL (ref 0.05–0.8)
MONOCYTES NFR BLD AUTO: 6.4 %
NEUTROPHILS # BLD AUTO: 5.09 X10*3/UL (ref 1.6–5.5)
NEUTROPHILS NFR BLD AUTO: 70.8 %
NRBC BLD-RTO: 0 /100 WBCS (ref 0–0)
PLATELET # BLD AUTO: 230 X10*3/UL (ref 150–450)
POTASSIUM SERPL-SCNC: 4.1 MMOL/L (ref 3.5–5.3)
PROT SERPL-MCNC: 6.9 G/DL (ref 6.4–8.2)
RBC # BLD AUTO: 4.27 X10*6/UL (ref 4–5.2)
SODIUM SERPL-SCNC: 143 MMOL/L (ref 136–145)
WBC # BLD AUTO: 7.2 X10*3/UL (ref 4.4–11.3)

## 2024-12-22 PROCEDURE — 84484 ASSAY OF TROPONIN QUANT: CPT

## 2024-12-22 PROCEDURE — 70450 CT HEAD/BRAIN W/O DYE: CPT | Performed by: STUDENT IN AN ORGANIZED HEALTH CARE EDUCATION/TRAINING PROGRAM

## 2024-12-22 PROCEDURE — 80053 COMPREHEN METABOLIC PANEL: CPT

## 2024-12-22 PROCEDURE — 36415 COLL VENOUS BLD VENIPUNCTURE: CPT

## 2024-12-22 PROCEDURE — 71046 X-RAY EXAM CHEST 2 VIEWS: CPT

## 2024-12-22 PROCEDURE — 71046 X-RAY EXAM CHEST 2 VIEWS: CPT | Performed by: STUDENT IN AN ORGANIZED HEALTH CARE EDUCATION/TRAINING PROGRAM

## 2024-12-22 PROCEDURE — 85025 COMPLETE CBC W/AUTO DIFF WBC: CPT

## 2024-12-22 PROCEDURE — 99285 EMERGENCY DEPT VISIT HI MDM: CPT | Mod: 25 | Performed by: STUDENT IN AN ORGANIZED HEALTH CARE EDUCATION/TRAINING PROGRAM

## 2024-12-22 PROCEDURE — 70450 CT HEAD/BRAIN W/O DYE: CPT

## 2024-12-22 ASSESSMENT — PAIN SCALES - GENERAL
PAINLEVEL_OUTOF10: 3
PAINLEVEL_OUTOF10: 0 - NO PAIN

## 2024-12-22 ASSESSMENT — COLUMBIA-SUICIDE SEVERITY RATING SCALE - C-SSRS
1. IN THE PAST MONTH, HAVE YOU WISHED YOU WERE DEAD OR WISHED YOU COULD GO TO SLEEP AND NOT WAKE UP?: NO
2. HAVE YOU ACTUALLY HAD ANY THOUGHTS OF KILLING YOURSELF?: NO
6. HAVE YOU EVER DONE ANYTHING, STARTED TO DO ANYTHING, OR PREPARED TO DO ANYTHING TO END YOUR LIFE?: NO

## 2024-12-22 ASSESSMENT — PAIN - FUNCTIONAL ASSESSMENT
PAIN_FUNCTIONAL_ASSESSMENT: 0-10
PAIN_FUNCTIONAL_ASSESSMENT: 0-10

## 2024-12-22 NOTE — ED TRIAGE NOTES
Pt presents ambulatory via POV through triage from home for c/o high BP, headache, neck pain, and blurred vision at close distance for the past 3-4 days. States her SBP was been 160-180 consistently for the past 3-4 days. Pt denies Hx of HTN. . X given by EMS PTA. Pt placed on cardiac monitor. Call light within reach.

## 2024-12-22 NOTE — ED PROVIDER NOTES
HPI   Chief Complaint   Patient presents with    Hypertension    Headache    Vision change       HPI  74-year-old female with history of hypothyroidism s/p thyroidectomy presenting with elevated blood pressure concern.  Patient reports home BP monitoring with SBP ranging from 140s-180s over the past couple days after with associated headache, left-sided neck pain, arm pain with sleeping, and blurry vision (when reading up close despite wearing glasses).  She denies any active chest pain, SOB, dizziness, lightheadedness, falls, fever, chills, N/V, leg swelling.      Patient History   Past Medical History:   Diagnosis Date    Allergic rhinitis due to pollen     Hayfever    Arthritis 1998    Asymptomatic varicose veins of bilateral lower extremities 12/09/2016    Varicose veins of legs    COVID-19 10/04/2022    COVID-19    Encounter for gynecological examination (general) (routine) without abnormal findings 11/06/2017    Visit for gynecologic examination    Encounter for other preprocedural examination 02/22/2017    Preoperative clearance    Laceration without foreign body of unspecified finger without damage to nail, initial encounter 05/27/2015    Finger laceration    Localized swelling, mass and lump, unspecified upper limb 10/28/2019    Mass of hand    Malignant neoplasm of thyroid gland (Multi) 08/21/2019    Papillary carcinoma of thyroid    Onycholysis 01/24/2019    Onycholysis of toenail    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    Hypoestrogenism    Other specified abnormal findings of blood chemistry 11/12/2015    Elevated liver function tests    Pain in joints of unspecified hand 06/11/2015    Pain in joint, hand    Pain in left leg 11/18/2020    Pain of left lower extremity    Pain in right ankle and joints of right foot 12/28/2020    Ankle pain, right    Pain in right toe(s) 01/24/2019    Pain around toenail, right foot    Paresthesia of skin     Paresthesia of arm    Personal history of colonic polyps      History of colonic polyps    Personal history of malignant neoplasm of thyroid 10/25/2021    History of malignant neoplasm of thyroid    Personal history of malignant neoplasm of thyroid 12/26/2018    History of malignant neoplasm of thyroid    Personal history of other diseases of the female genital tract 05/20/2019    History of uterine prolapse    Personal history of other diseases of the respiratory system     History of sinusitis    Personal history of other endocrine, nutritional and metabolic disease 02/22/2017    History of high cholesterol    Personal history of other infectious and parasitic diseases 04/29/2019    History of herpes zoster    Personal history of other specified conditions 05/20/2019    History of urinary incontinence    Personal history of urinary calculi 04/24/2019    History of renal calculi    Prediabetes 10/25/2021    Prediabetes    Radiculopathy, cervical region 11/18/2020    Cervical radiculopathy    Radiculopathy, lumbar region 01/14/2021    Lumbar radiculopathy    Unspecified open wound of unspecified toe(s) with damage to nail, initial encounter 01/24/2019    Traumatic avulsion of nail plate of toe    Urge incontinence 11/15/2021    Urge incontinence    Urinary tract infection, site not specified 01/13/2022    Acute lower UTI    Varicose veins of bilateral lower extremities with pain 08/21/2019    Varicose veins of bilateral lower extremities with pain     Past Surgical History:   Procedure Laterality Date    COLONOSCOPY  02/07/2017    Colonoscopy    TONSILLECTOMY  02/07/2017    Tonsillectomy    TOTAL THYROIDECTOMY  08/11/2014    Thyroid Surgery Total Thyroidectomy    TUBAL LIGATION  1978     Family History   Problem Relation Name Age of Onset    Diabetes Mother Aida Capps     Kidney disease Mother Adia Capps     Hypertension Mother Aida Capps     Heart disease Mother Aida Capps     Hypertension Father Jose J Capps     Diabetes Father Jose J Capps      Parkinsonism Father Jose J Capps     Hypertension Maternal Grandmother Janina Mann     Ovarian cancer Maternal Grandmother Janina Mann     Hypertension Maternal Grandfather Derek Mann     Heart disease Paternal Grandmother Aide capps     Heart disease Paternal Grandfather Rui Capps     Breast cancer Mother's Sister       Social History     Tobacco Use    Smoking status: Never    Smokeless tobacco: Never   Substance Use Topics    Alcohol use: Never    Drug use: Never       Physical Exam   ED Triage Vitals   Temp Pulse Resp BP   -- -- -- --      SpO2 Temp src Heart Rate Source Patient Position   -- -- -- --      BP Location FiO2 (%)     -- --       Physical Exam  HENT:      Head: Normocephalic and atraumatic.   Eyes:      General: No scleral icterus.     Conjunctiva/sclera: Conjunctivae normal.   Cardiovascular:      Rate and Rhythm: Normal rate and regular rhythm.      Heart sounds: No murmur heard.     No friction rub. No gallop.   Pulmonary:      Effort: Pulmonary effort is normal. No respiratory distress.      Breath sounds: Normal breath sounds. No wheezing.   Abdominal:      General: There is no distension.      Palpations: Abdomen is soft.      Tenderness: There is no abdominal tenderness.   Musculoskeletal:         General: Normal range of motion.   Skin:     General: Skin is warm and dry.   Neurological:      General: No focal deficit present.      Mental Status: She is alert and oriented to person, place, and time.      Cranial Nerves: No cranial nerve deficit.      Sensory: No sensory deficit.   Psychiatric:         Mood and Affect: Mood normal.     ED Course & MDM   Diagnoses as of 12/22/24 1727   Hypertension, unspecified type        Obtain CT head, EKG, CXR, CBC with differential, CMP, troponin       No data recorded                                 Medical Decision Making  This is a 74-year-old female with a history of hypothyroidism s/p thyroidectomy presenting with  hypertensive emergency. Differential diagnoses for elevated blood pressure include uncontrolled hypertension, stress-related BP elevation, and increased sodium intake. Associated symptoms include headache, left-sided neck and arm pain related to sleeping position, and blurry near vision despite using corrective lenses. She denies chest pain, shortness of breath, dizziness, lightheadedness, falls, fever, chills, nausea, vomiting, or leg swelling.  CVA less likely as CT head without acute intracranial abnormality.  ACS less likely as patient found to have negative troponin with EKG showing NSR with occasional PVCs.  CBC and CMP unremarkable.  CXR without acute pulmonary pathology.  Encourage patient to continue monitoring blood pressure when symptomatic.  Follow-up with primary care provider for consideration of starting antihypertensive agent.  Patient discharged in stable condition with appropriate follow-up information provided.      Xander Cabrera DO  Family Medicine PGY-3     Xander Cabrera DO  Resident  12/22/24 [id number]

## 2025-01-03 DIAGNOSIS — E03.9 HYPOTHYROIDISM, UNSPECIFIED TYPE: ICD-10-CM

## 2025-01-06 RX ORDER — LEVOTHYROXINE SODIUM 112 UG/1
112 TABLET ORAL DAILY
Qty: 90 TABLET | Refills: 1 | Status: SHIPPED | OUTPATIENT
Start: 2025-01-06

## 2025-01-08 ENCOUNTER — APPOINTMENT (OUTPATIENT)
Dept: PRIMARY CARE | Facility: CLINIC | Age: 75
End: 2025-01-08
Payer: MEDICARE

## 2025-01-10 ENCOUNTER — APPOINTMENT (OUTPATIENT)
Dept: OBSTETRICS AND GYNECOLOGY | Facility: CLINIC | Age: 75
End: 2025-01-10
Payer: MEDICARE

## 2025-01-10 VITALS
SYSTOLIC BLOOD PRESSURE: 128 MMHG | BODY MASS INDEX: 26.2 KG/M2 | DIASTOLIC BLOOD PRESSURE: 86 MMHG | WEIGHT: 183 LBS | HEIGHT: 70 IN

## 2025-01-10 DIAGNOSIS — L90.0 LICHEN SCLEROSUS: Primary | ICD-10-CM

## 2025-01-10 PROCEDURE — 1036F TOBACCO NON-USER: CPT | Performed by: NURSE PRACTITIONER

## 2025-01-10 PROCEDURE — 1126F AMNT PAIN NOTED NONE PRSNT: CPT | Performed by: NURSE PRACTITIONER

## 2025-01-10 PROCEDURE — 99213 OFFICE O/P EST LOW 20 MIN: CPT | Performed by: NURSE PRACTITIONER

## 2025-01-10 PROCEDURE — 1157F ADVNC CARE PLAN IN RCRD: CPT | Performed by: NURSE PRACTITIONER

## 2025-01-10 PROCEDURE — 1159F MED LIST DOCD IN RCRD: CPT | Performed by: NURSE PRACTITIONER

## 2025-01-10 ASSESSMENT — ENCOUNTER SYMPTOMS
ALLERGIC/IMMUNOLOGIC NEGATIVE: 0
PSYCHIATRIC NEGATIVE: 0
EYES NEGATIVE: 0
RESPIRATORY NEGATIVE: 0
CONSTITUTIONAL NEGATIVE: 0
MUSCULOSKELETAL NEGATIVE: 0
CARDIOVASCULAR NEGATIVE: 0
GASTROINTESTINAL NEGATIVE: 0
NEUROLOGICAL NEGATIVE: 0
ENDOCRINE NEGATIVE: 0
HEMATOLOGIC/LYMPHATIC NEGATIVE: 0

## 2025-01-10 ASSESSMENT — PAIN SCALES - GENERAL: PAINLEVEL_OUTOF10: 0-NO PAIN

## 2025-01-10 NOTE — PROGRESS NOTES
Subjective   Patient ID: Marcelle Shelton is a 74 y.o. female who presents for Follow-up.  HPI  Follow up from 11/2024  I diagnosed LS per exam and prescribed clobetasol, negative yeast culture  Previous h/o LS but she was not on treatment  I recommended a barrier cream d/t incontinence and continued skin irritation    Today she states she is feeling much better; would like surgical treatment for incontinence  Applying barrier daily in the morning and clobetasol nightly  Review of Systems    Objective   Physical Exam  Genitourinary:         Comments: Erythema of the vulva, some whitening as marked above but tissue texture much improved  No lesions        Assessment/Plan   Diagnoses and all orders for this visit:  Lichen sclerosus    Advised to decrease clobetasol to 2-3 times/week   I asked her to make a follow up appointment with Dr. Baig to discuss treatment for incontinence  She states she is tired of applying creams to the vulva/vagina; we discussed the benefit of vaginal estrogen but will defer treatment till after consult with Dr. Baig  She may find the Estring less cumbersome than the estradiol cream       OPAL Petty-CNP 01/10/25 11:35 AM

## 2025-01-10 NOTE — LETTER
January 10, 2025     Nadia Baig MD  [address] [facility] [address] [facility], [address]  [facility] [address]    Patient: Marcelle Shelton   YOB: 1950   Date of Visit: 1/10/2025       Dear Dr. Nadia Baig MD:    I am sending Marcelle Shelton back to you to discuss options for incontinence. Below are my notes for this consultation.  If you have questions, please do not hesitate to call me. I look forward to following your patient along with you.       Sincerely,     OPAL Petty-CNP      CC: No Recipients  ______________________________________________________________________________________    Subjective  Patient ID: Marcelle Shelton is a 74 y.o. female who presents for Follow-up.  HPI  Follow up from 11/2024  I diagnosed LS per exam and prescribed clobetasol, negative yeast culture  Previous h/o LS but she was not on treatment  I recommended a barrier cream d/t incontinence and continued skin irritation    Today she states she is feeling much better; would like surgical treatment for incontinence  Applying barrier daily in the morning and clobetasol nightly  Review of Systems    Objective  Physical Exam  Genitourinary:         Comments: Erythema of the vulva, some whitening as marked above but tissue texture much improved  No lesions        Assessment/Plan  Diagnoses and all orders for this visit:  Lichen sclerosus    Advised to decrease clobetasol to 2-3 times/week   I asked her to make a follow up appointment with Dr. Baig to discuss treatment for incontinence  She states she is tired of applying creams to the vulva/vagina; we discussed the benefit of vaginal estrogen but will defer treatment till after consult with Dr. Baig  She may find the Estring less cumbersome than the estradiol cream       OPAL Petty-CNP 01/10/25 11:35 AM

## 2025-01-15 ENCOUNTER — APPOINTMENT (OUTPATIENT)
Dept: PRIMARY CARE | Facility: CLINIC | Age: 75
End: 2025-01-15
Payer: MEDICARE

## 2025-01-15 VITALS
WEIGHT: 183 LBS | HEART RATE: 80 BPM | DIASTOLIC BLOOD PRESSURE: 84 MMHG | TEMPERATURE: 98.5 F | HEIGHT: 70 IN | OXYGEN SATURATION: 97 % | BODY MASS INDEX: 26.2 KG/M2 | SYSTOLIC BLOOD PRESSURE: 138 MMHG

## 2025-01-15 DIAGNOSIS — R79.89 LOW VITAMIN D LEVEL: ICD-10-CM

## 2025-01-15 DIAGNOSIS — R03.0 ELEVATED BLOOD PRESSURE READING: Primary | ICD-10-CM

## 2025-01-15 DIAGNOSIS — N95.2 VAGINAL ATROPHY: ICD-10-CM

## 2025-01-15 DIAGNOSIS — Z13.6 ENCOUNTER FOR SCREENING FOR CARDIOVASCULAR DISORDERS: ICD-10-CM

## 2025-01-15 DIAGNOSIS — R73.9 HYPERGLYCEMIA: ICD-10-CM

## 2025-01-15 DIAGNOSIS — Z12.31 ENCOUNTER FOR SCREENING MAMMOGRAM FOR MALIGNANT NEOPLASM OF BREAST: ICD-10-CM

## 2025-01-15 DIAGNOSIS — E03.9 ACQUIRED HYPOTHYROIDISM: ICD-10-CM

## 2025-01-15 DIAGNOSIS — E78.2 COMBINED HYPERLIPIDEMIA: ICD-10-CM

## 2025-01-15 PROCEDURE — G2211 COMPLEX E/M VISIT ADD ON: HCPCS | Performed by: FAMILY MEDICINE

## 2025-01-15 PROCEDURE — 1160F RVW MEDS BY RX/DR IN RCRD: CPT | Performed by: FAMILY MEDICINE

## 2025-01-15 PROCEDURE — 1036F TOBACCO NON-USER: CPT | Performed by: FAMILY MEDICINE

## 2025-01-15 PROCEDURE — 99214 OFFICE O/P EST MOD 30 MIN: CPT | Performed by: FAMILY MEDICINE

## 2025-01-15 PROCEDURE — 1157F ADVNC CARE PLAN IN RCRD: CPT | Performed by: FAMILY MEDICINE

## 2025-01-15 PROCEDURE — 1159F MED LIST DOCD IN RCRD: CPT | Performed by: FAMILY MEDICINE

## 2025-01-15 ASSESSMENT — ENCOUNTER SYMPTOMS: SHORTNESS OF BREATH: 0

## 2025-01-15 ASSESSMENT — PATIENT HEALTH QUESTIONNAIRE - PHQ9
1. LITTLE INTEREST OR PLEASURE IN DOING THINGS: NOT AT ALL
SUM OF ALL RESPONSES TO PHQ9 QUESTIONS 1 AND 2: 0
2. FEELING DOWN, DEPRESSED OR HOPELESS: NOT AT ALL

## 2025-01-15 NOTE — PROGRESS NOTES
"Subjective   Patient ID: Marcelle Shelton is a 74 y.o. female who presents for ER  Follow-up regarding elevated BP; pt states she started clobetasol and is not certain this may cause htn. Pt also under stress with her  being ill and had company coming over before she began feeling an increase in her BP.     Was stressed out   Bps went up to 160-170 so went to er ,  was 180 when there     Ct head was ok        Blood pressures have been averagin/75 ,  114 yesterday   Pt denies Chest Pain or Shortness of Breath   Headaches resolves     Incontinence   Saw urogyn :  on clobetasol , some acne       Hypothyroidism: Energy level has been stable, weight has been stable, patient is tolerating medication well           Review of Systems   Respiratory:  Negative for shortness of breath.    Cardiovascular:  Negative for chest pain.       Objective   /84   Pulse 80   Temp 36.9 °C (98.5 °F)   Ht 1.778 m (5' 10\")   Wt 83 kg (183 lb)   SpO2 97%   BMI 26.26 kg/m²     Physical Exam  Constitutional:       Appearance: Normal appearance. She is well-developed.   Cardiovascular:      Rate and Rhythm: Normal rate and regular rhythm.      Heart sounds: Normal heart sounds. No murmur heard.  Pulmonary:      Effort: Pulmonary effort is normal.      Breath sounds: Normal breath sounds.   Neurological:      General: No focal deficit present.      Mental Status: She is alert.   Psychiatric:         Mood and Affect: Mood normal.         Behavior: Behavior normal.         Assessment/Plan   Problem List Items Addressed This Visit             ICD-10-CM    Combined hyperlipidemia E78.2    Relevant Orders    Comprehensive Metabolic Panel    CBC and Auto Differential    Lipid Panel    Hemoglobin A1C    Thyroid Stimulating Hormone    Thyroxine, Free    Hyperglycemia R73.9    Relevant Orders    Comprehensive Metabolic Panel    CBC and Auto Differential    Lipid Panel    Hemoglobin A1C    Thyroid Stimulating Hormone    Thyroxine, " Free    Hypothyroidism E03.9    Relevant Orders    Comprehensive Metabolic Panel    CBC and Auto Differential    Lipid Panel    Hemoglobin A1C    Thyroid Stimulating Hormone    Thyroxine, Free    Low vitamin D level R79.89    Relevant Orders    Comprehensive Metabolic Panel    CBC and Auto Differential    Lipid Panel    Hemoglobin A1C    Thyroid Stimulating Hormone    Thyroxine, Free     Other Visit Diagnoses         Codes    Elevated blood pressure reading    -  Primary R03.0    Relevant Orders    Comprehensive Metabolic Panel    CBC and Auto Differential    Lipid Panel    Hemoglobin A1C    Thyroid Stimulating Hormone    Thyroxine, Free    Encounter for screening for cardiovascular disorders     Z13.6    Relevant Orders    CT cardiac scoring wo IV contrast    Encounter for screening mammogram for malignant neoplasm of breast     Z12.31    Relevant Orders    BI mammo bilateral screening tomosynthesis

## 2025-01-15 NOTE — PATIENT INSTRUCTIONS
It is recommended that you check your blood pressure at least twice per month, this can be done on a home machine or at the drugstore grocery store.  your blood pressure should be less than 140/90  You should follow a low-salt diet, get regular exercise, keep your weight under control.      Get your blood work as ordered.  You should hear from our office with results whether they are normal are not within a few days.  Please call the office if you do not hear from us.       Coronary calcium score to assess heart       Hypothyroidism:  It is important that you continue your medications as directed by physician.  In most cases with hypothyroidism people require medications lifelong.  If you have changes in your symptoms such as increasing fatigue, weight gain, dry skin please let your physician.

## 2025-01-21 ENCOUNTER — APPOINTMENT (OUTPATIENT)
Dept: RADIOLOGY | Facility: HOSPITAL | Age: 75
End: 2025-01-21
Payer: MEDICARE

## 2025-02-21 LAB
ALBUMIN SERPL-MCNC: 4.5 G/DL (ref 3.6–5.1)
ALP SERPL-CCNC: 63 U/L (ref 37–153)
ALT SERPL-CCNC: 23 U/L (ref 6–29)
ANION GAP SERPL CALCULATED.4IONS-SCNC: 8 MMOL/L (CALC) (ref 7–17)
AST SERPL-CCNC: 18 U/L (ref 10–35)
BASOPHILS # BLD AUTO: 59 CELLS/UL (ref 0–200)
BASOPHILS NFR BLD AUTO: 1.2 %
BILIRUB SERPL-MCNC: 0.7 MG/DL (ref 0.2–1.2)
BUN SERPL-MCNC: 21 MG/DL (ref 7–25)
CALCIUM SERPL-MCNC: 9.2 MG/DL (ref 8.6–10.4)
CHLORIDE SERPL-SCNC: 102 MMOL/L (ref 98–110)
CHOLEST SERPL-MCNC: 237 MG/DL
CHOLEST/HDLC SERPL: 3.7 (CALC)
CO2 SERPL-SCNC: 30 MMOL/L (ref 20–32)
CREAT SERPL-MCNC: 0.65 MG/DL (ref 0.6–1)
EGFRCR SERPLBLD CKD-EPI 2021: 91 ML/MIN/1.73M2
EOSINOPHIL # BLD AUTO: 250 CELLS/UL (ref 15–500)
EOSINOPHIL NFR BLD AUTO: 5.1 %
ERYTHROCYTE [DISTWIDTH] IN BLOOD BY AUTOMATED COUNT: 12.9 % (ref 11–15)
EST. AVERAGE GLUCOSE BLD GHB EST-MCNC: 128 MG/DL
EST. AVERAGE GLUCOSE BLD GHB EST-SCNC: 7.1 MMOL/L
GLUCOSE SERPL-MCNC: 93 MG/DL (ref 65–99)
HBA1C MFR BLD: 6.1 % OF TOTAL HGB
HCT VFR BLD AUTO: 42.6 % (ref 35–45)
HDLC SERPL-MCNC: 64 MG/DL
HGB BLD-MCNC: 14.2 G/DL (ref 11.7–15.5)
LDLC SERPL CALC-MCNC: 155 MG/DL (CALC)
LYMPHOCYTES # BLD AUTO: 1656 CELLS/UL (ref 850–3900)
LYMPHOCYTES NFR BLD AUTO: 33.8 %
MCH RBC QN AUTO: 31.6 PG (ref 27–33)
MCHC RBC AUTO-ENTMCNC: 33.3 G/DL (ref 32–36)
MCV RBC AUTO: 94.7 FL (ref 80–100)
MONOCYTES # BLD AUTO: 515 CELLS/UL (ref 200–950)
MONOCYTES NFR BLD AUTO: 10.5 %
NEUTROPHILS # BLD AUTO: 2421 CELLS/UL (ref 1500–7800)
NEUTROPHILS NFR BLD AUTO: 49.4 %
NONHDLC SERPL-MCNC: 173 MG/DL (CALC)
PLATELET # BLD AUTO: 273 THOUSAND/UL (ref 140–400)
PMV BLD REES-ECKER: 11 FL (ref 7.5–12.5)
POTASSIUM SERPL-SCNC: 4 MMOL/L (ref 3.5–5.3)
PROT SERPL-MCNC: 6.8 G/DL (ref 6.1–8.1)
RBC # BLD AUTO: 4.5 MILLION/UL (ref 3.8–5.1)
SODIUM SERPL-SCNC: 140 MMOL/L (ref 135–146)
T4 FREE SERPL-MCNC: 1.6 NG/DL (ref 0.8–1.8)
TRIGL SERPL-MCNC: 79 MG/DL
TSH SERPL-ACNC: 0.93 MIU/L (ref 0.4–4.5)
WBC # BLD AUTO: 4.9 THOUSAND/UL (ref 3.8–10.8)

## 2025-02-21 NOTE — RESULT ENCOUNTER NOTE
Your cholesterol levels were moderately high.  I would recommend regular exercise, keep your weight under control, and follow a low fat, low cholesterol diet.  Plant-based diets are ideal for reducing cholesterol and heart disease risk.  A diet high in fruits and vegetables, legumes, nuts, whole grains and fish with minimal amounts of animal products is recommended.  I would also suggest adding on a Fish oil supplement daily.  You can ry an over-the-counter sterol supplement such as SmartBalance Margarine, Cholest-off or similar.  I would like to repeat a fasting lipid profile in 6 months.  Sugars a little higher also recommend follow-up 6 months to reassess  The rest of the labs are normal.

## 2025-02-28 ENCOUNTER — APPOINTMENT (OUTPATIENT)
Dept: RADIOLOGY | Facility: HOSPITAL | Age: 75
End: 2025-02-28
Payer: MEDICARE

## 2025-03-06 ENCOUNTER — TELEPHONE (OUTPATIENT)
Dept: PRIMARY CARE | Facility: CLINIC | Age: 75
End: 2025-03-06
Payer: MEDICARE

## 2025-03-06 NOTE — TELEPHONE ENCOUNTER
Patient states she was invited for a spa weekend with her daughter to do IV Therapy. She states they give vitamins, minerals, and nutrients through an IV for skin health. She would like to know if  would approve of her doing this given her health history. Please advise.